# Patient Record
Sex: FEMALE | Race: WHITE | NOT HISPANIC OR LATINO | Employment: UNEMPLOYED
[De-identification: names, ages, dates, MRNs, and addresses within clinical notes are randomized per-mention and may not be internally consistent; named-entity substitution may affect disease eponyms.]

---

## 2017-06-17 ENCOUNTER — HEALTH MAINTENANCE LETTER (OUTPATIENT)
Age: 52
End: 2017-06-17

## 2018-04-01 ENCOUNTER — TRANSFERRED RECORDS (OUTPATIENT)
Dept: HEALTH INFORMATION MANAGEMENT | Facility: CLINIC | Age: 53
End: 2018-04-01

## 2018-04-01 LAB — PAP SMEAR - HIM PATIENT REPORTED: NEGATIVE

## 2019-02-11 ENCOUNTER — TRANSFERRED RECORDS (OUTPATIENT)
Dept: HEALTH INFORMATION MANAGEMENT | Facility: CLINIC | Age: 54
End: 2019-02-11

## 2019-02-25 ENCOUNTER — TRANSFERRED RECORDS (OUTPATIENT)
Dept: HEALTH INFORMATION MANAGEMENT | Facility: CLINIC | Age: 54
End: 2019-02-25

## 2019-02-25 ENCOUNTER — OFFICE VISIT (OUTPATIENT)
Dept: FAMILY MEDICINE | Facility: CLINIC | Age: 54
End: 2019-02-25
Payer: COMMERCIAL

## 2019-02-25 VITALS
SYSTOLIC BLOOD PRESSURE: 137 MMHG | HEART RATE: 83 BPM | BODY MASS INDEX: 27.6 KG/M2 | OXYGEN SATURATION: 99 % | WEIGHT: 150 LBS | TEMPERATURE: 97 F | DIASTOLIC BLOOD PRESSURE: 86 MMHG | HEIGHT: 62 IN

## 2019-02-25 DIAGNOSIS — R03.0 ELEVATED BP WITHOUT DIAGNOSIS OF HYPERTENSION: Primary | ICD-10-CM

## 2019-02-25 DIAGNOSIS — R07.89 CHEST PRESSURE: ICD-10-CM

## 2019-02-25 DIAGNOSIS — D12.6 TUBULAR ADENOMA OF COLON: ICD-10-CM

## 2019-02-25 LAB
CHOLEST SERPL-MCNC: 258 MG/DL
EJECTION FRACTION: 73
HDLC SERPL-MCNC: 110 MG/DL
LDLC SERPL CALC-MCNC: 130 MG/DL
NONHDLC SERPL-MCNC: 148 MG/DL
TRIGL SERPL-MCNC: 90 MG/DL
TSH SERPL DL<=0.005 MIU/L-ACNC: 2.44 MU/L (ref 0.4–4)

## 2019-02-25 PROCEDURE — 36415 COLL VENOUS BLD VENIPUNCTURE: CPT | Performed by: INTERNAL MEDICINE

## 2019-02-25 PROCEDURE — 84443 ASSAY THYROID STIM HORMONE: CPT | Performed by: INTERNAL MEDICINE

## 2019-02-25 PROCEDURE — 80061 LIPID PANEL: CPT | Performed by: INTERNAL MEDICINE

## 2019-02-25 PROCEDURE — 99203 OFFICE O/P NEW LOW 30 MIN: CPT | Performed by: INTERNAL MEDICINE

## 2019-02-25 ASSESSMENT — MIFFLIN-ST. JEOR: SCORE: 1238.65

## 2019-02-25 NOTE — PATIENT INSTRUCTIONS
I will send you the labs from today    Monitor your blood pressure 3 or 4x a week, sit for 5 minutes first.  If it is not staying below 140/90 let me know.    Lencho Goldberg M.D.

## 2019-02-25 NOTE — Clinical Note
[facility] by harry benz 4/18 and Kellie Goldberg M.D.

## 2019-02-25 NOTE — PROGRESS NOTES
This is a 53-year-old female who I have not seen in many years here for follow-up for her chest pain as well as high blood pressure.  She has no history of hypertension but it does run in the family.  She does not smoke and has minimal alcohol.  Some caffeine and infrequent NSAIDs.  She does use some salt.    Last Thursday at home she developed chest pressure as well as feeling short of breath.  She went into months grocery store and her blood pressure was quite high so she went to an urgent care and then was directed to the ER.  I reviewed the ER notes and labs.  Her troponins and d-dimer were normal and her BMP and CBC was normal.  She was sent out to have a stress test but due to elevated blood pressure they could not do it but are doing it today.    She has no history of chest pressure and she is very active.  She did have a lot of stress that day.  Since then she is felt fine without any headaches or dizziness.  No chest pain or shortness of breath.  She does work out and does not have chest pain or shortness of breath.  No recent dizziness or palpitations.               Past Medical History:      Past Medical History:   Diagnosis Date     Elevated blood pressure reading without diagnosis of hypertension (aka BP)      Tubular adenoma of colon 02/2016    fu 5 years             Past Surgical History:      Past Surgical History:   Procedure Laterality Date     COLONOSCOPY N/A 2/22/2016    Procedure: COMBINED COLONOSCOPY, SINGLE OR MULTIPLE BIOPSY/POLYPECTOMY BY BIOPSY;  Surgeon: Carie Barnes MD;  Location:  GI     KNEE [facility]GERY [employer]             Social History:     Social History     Socioeconomic History     Marital status:      Spouse name: Not on file     Number of children: 3     Years of education: Not on file     Highest education level: Not on file   Occupational History     Occupation: volunteer work   Social Needs     Financial resource strain: Not on file     Food insecurity:  "    Worry: Not on file     Inability: Not on file     Transportation needs:     Medical: Not on file     Non-medical: Not on file   Tobacco Use     Smoking status: Never Smoker     Smokeless tobacco: Never Used   Substance and Sexual Activity     Alcohol use: Yes     Alcohol/week: 2.5 oz     Types: 5 Standard drinks or equivalent per week     Comment: 5 drinks a week      Drug use: No     Sexual activity: Yes     Partners: Male   Lifestyle     Physical activity:     Days per week: Not on file     Minutes per session: Not on file     Stress: Not on file   Relationships     Social connections:     Talks on phone: Not on file     Gets together: Not on file     Attends Hindu service: Not on file     Active member of club or organization: Not on file     Attends meetings of clubs or organizations: Not on file     Relationship status: Not on file     Intimate partner violence:     Fear of current or ex partner: Not on file     Emotionally abused: Not on file     Physically abused: Not on file     Forced sexual activity: Not on file   Other Topics Concern     Parent/sibling w/ CABG, MI or angioplasty before 65F 55M? Not Asked   Social History Narrative     Not on file             Family History:   reviewed         Allergies:   No Known Allergies          Medications:     No current outpatient medications on file.               Review of Systems:   The 10 point Review of Systems is negative other than noted in the HPI           Physical Exam:   Blood pressure 137/86, pulse 83, temperature 97  F (36.1  C), temperature source Oral, height 1.575 m (5' 2\"), weight 68 kg (150 lb), SpO2 99 %.  Blood pressure same bilat for me  Exam:  Constitutional: healthy appearing, alert and in no distress  Heent: Normocephalic. Head without obvious masses or lesions. EOMI. Mouth exam within normal limits: tongue, mucous membranes, posterior pharynx all normal, no lesions or abnormalities seen. Neck supple, no nuchal rigidity or masses. No " supraclavicular, or cervical adenopathy. Thyroid symmetric, no masses.  Cardiovascular: Regular rate and rhythm, 1/6 sm lusb, no rub or gallops.  JVP not elevated, no edema.  Carotids within normal limits bilaterally, no bruits.  Respiratory: Normal respiratory effort.  Lungs clear, normal flow, no wheezing or crackles.  Gastrointestinal: Normal active bowel sounds.   Soft, not tender, no masses, guarding or rebound.  No hepatosplenomegaly.   Musculoskeletal: extremities normal, no gross deformities noted.  Skin: no suspicious lesions or rashes   Neurologic: Mental status within normal limits.  Speech fluent.  No gross motor abnormalities and gait intact.  Psychiatric: mentation appears normal and affect normal.         Data:   Labs noted, others sent today        Assessment:   1. Chest pressure and shortness of breath, suspect stress but has est today, doubt aneurysm, pulmonary embolis, ptx, ascvd, etc  2. Hypertension, ?real, ?due to stress although fairly high.  No signs secondary cause, will check other labs         Plan:   Est today  Other labs today  She will get cuff, monitor blood pressure and if not under 140/90 call  Exercise, diet and weight loss rec      Lencho Goldberg M.D.

## 2019-02-26 ENCOUNTER — TELEPHONE (OUTPATIENT)
Dept: FAMILY MEDICINE | Facility: CLINIC | Age: 54
End: 2019-02-26

## 2019-02-26 NOTE — RESULT ENCOUNTER NOTE
It was a please seeing you.  You should be able to view your labs.    Your TSH or thyroid test is normal indicating no active thyroid issues.  Your total cholesterol is 258 which sounds high.  However, you have a tremendous amount of HDL or good cholesterol at 110.  Your LDL or bad cholesterol is only slightly elevated at 130.  Given the super amount of HDL I am not at all concerned but would strongly encourage regular exercise and healthy diet.    I am happy to bring you this excellent report.  If you have any questions let me know.    Lencho Goldberg M.D.

## 2019-02-26 NOTE — TELEPHONE ENCOUNTER
Got it and it looks normal, please monitor blood pressure and if up call    Lencho Goldberg M.D.

## 2019-02-26 NOTE — TELEPHONE ENCOUNTER
Reason for Call:   Stress Test    Detailed comments:   Pt had appt with Dr. Goldberg yesterday 01/25/2019.  She had a stress test performed and completed at [facility].  She called to confirmed it had been faxed.  It has been received and placed on Dr. Goldberg's desk.      Phone Number Patient can be reached at: Home number on file [phone] (home)    Best Time: Any     Can we leave a detailed message on this number? YES    Call taken on 2/26/2019 at 1:50 PM by Daya Woods

## 2019-08-05 ENCOUNTER — TRANSFERRED RECORDS (OUTPATIENT)
Dept: HEALTH INFORMATION MANAGEMENT | Facility: CLINIC | Age: 54
End: 2019-08-05

## 2019-08-26 ENCOUNTER — TRANSFERRED RECORDS (OUTPATIENT)
Dept: HEALTH INFORMATION MANAGEMENT | Facility: CLINIC | Age: 54
End: 2019-08-26

## 2019-09-23 ENCOUNTER — TRANSFERRED RECORDS (OUTPATIENT)
Dept: HEALTH INFORMATION MANAGEMENT | Facility: CLINIC | Age: 54
End: 2019-09-23

## 2019-10-01 ENCOUNTER — HEALTH MAINTENANCE LETTER (OUTPATIENT)
Age: 54
End: 2019-10-01

## 2021-01-15 ENCOUNTER — HEALTH MAINTENANCE LETTER (OUTPATIENT)
Age: 56
End: 2021-01-15

## 2021-03-26 ENCOUNTER — TELEPHONE (OUTPATIENT)
Dept: FAMILY MEDICINE | Facility: CLINIC | Age: 56
End: 2021-03-26

## 2021-03-26 NOTE — TELEPHONE ENCOUNTER
Reason for Call: Request for an order or referral:    Order or referral being requested: order    Date needed: as soon as possible    Has the patient been seen by the PCP for this problem? YES    Additional comments: Patient is in [place] and got a Moderma shot on 03/24/21 with lot#[id number]. Patient will be back in [place] and is wondering if he can get an order to get the 2nd shot. Thank you    Phone number Patient can be reached at:  Cell number on file:    Telephone Information:   Mobile [phone]       Best Time:  anytime    Can we leave a detailed message on this number?  YES    Call taken on 3/26/2021 at 11:19 AM by Audelia Peralta

## 2021-04-24 ENCOUNTER — IMMUNIZATION (OUTPATIENT)
Dept: NURSING | Facility: CLINIC | Age: 56
End: 2021-04-24
Attending: INTERNAL MEDICINE
Payer: COMMERCIAL

## 2021-04-24 DIAGNOSIS — Z23 NEED FOR VACCINATION: ICD-10-CM

## 2021-04-24 DIAGNOSIS — Z23 HIGH PRIORITY FOR 2019 NOVEL CORONAVIRUS VACCINATION: ICD-10-CM

## 2021-04-24 PROCEDURE — 91301 PR COVID VAC MODERNA 100 MCG/0.5 ML IM: CPT

## 2021-04-24 PROCEDURE — 0011A PR COVID VAC MODERNA 100 MCG/0.5 ML IM: CPT

## 2021-05-15 ENCOUNTER — HEALTH MAINTENANCE LETTER (OUTPATIENT)
Age: 56
End: 2021-05-15

## 2021-09-04 ENCOUNTER — HEALTH MAINTENANCE LETTER (OUTPATIENT)
Age: 56
End: 2021-09-04

## 2021-09-20 ENCOUNTER — TRANSFERRED RECORDS (OUTPATIENT)
Dept: MULTI SPECIALTY CLINIC | Facility: CLINIC | Age: 56
End: 2021-09-20

## 2021-09-20 LAB — PAP SMEAR - HIM PATIENT REPORTED: NEGATIVE

## 2021-09-23 DIAGNOSIS — Z11.59 ENCOUNTER FOR SCREENING FOR OTHER VIRAL DISEASES: ICD-10-CM

## 2021-10-01 ENCOUNTER — APPOINTMENT (OUTPATIENT)
Dept: URBAN - METROPOLITAN AREA CLINIC 255 | Age: 56
Setting detail: DERMATOLOGY
End: 2021-10-10

## 2021-10-01 DIAGNOSIS — L57.0 ACTINIC KERATOSIS: ICD-10-CM

## 2021-10-01 DIAGNOSIS — L82.1 OTHER SEBORRHEIC KERATOSIS: ICD-10-CM

## 2021-10-01 PROCEDURE — 17003 DESTRUCT PREMALG LES 2-14: CPT

## 2021-10-01 PROCEDURE — OTHER COUNSELING: OTHER

## 2021-10-01 PROCEDURE — OTHER LIQUID NITROGEN (COSMETIC): OTHER

## 2021-10-01 PROCEDURE — 17000 DESTRUCT PREMALG LESION: CPT

## 2021-10-01 PROCEDURE — OTHER MIPS QUALITY: OTHER

## 2021-10-01 PROCEDURE — OTHER LIQUID NITROGEN: OTHER

## 2021-10-01 ASSESSMENT — LOCATION SIMPLE DESCRIPTION DERM
LOCATION SIMPLE: LEFT EAR
LOCATION SIMPLE: LEFT FOREHEAD

## 2021-10-01 ASSESSMENT — LOCATION ZONE DERM
LOCATION ZONE: EAR
LOCATION ZONE: FACE

## 2021-10-01 ASSESSMENT — LOCATION DETAILED DESCRIPTION DERM
LOCATION DETAILED: LEFT FOREHEAD
LOCATION DETAILED: LEFT CYMBA CONCHA

## 2021-10-01 NOTE — PROCEDURE: MIPS QUALITY
No manual forms completed during downtime.
Quality 110: Preventive Care And Screening: Influenza Immunization: Influenza Immunization not Administered because Patient Refused.
Quality 226: Preventive Care And Screening: Tobacco Use: Screening And Cessation Intervention: Patient screened for tobacco use and is an ex/non-smoker
Quality 431: Preventive Care And Screening: Unhealthy Alcohol Use - Screening: Patient not identified as an unhealthy alcohol user when screened for unhealthy alcohol use using a systematic screening method
Quality 130: Documentation Of Current Medications In The Medical Record: Current Medications Documented
Detail Level: Detailed

## 2021-10-27 ENCOUNTER — HOSPITAL ENCOUNTER (OUTPATIENT)
Facility: CLINIC | Age: 56
Discharge: HOME | End: 2021-10-27
Attending: COLON & RECTAL SURGERY | Admitting: COLON & RECTAL SURGERY
Payer: COMMERCIAL

## 2021-10-27 VITALS
DIASTOLIC BLOOD PRESSURE: 75 MMHG | SYSTOLIC BLOOD PRESSURE: 126 MMHG | WEIGHT: 145 LBS | RESPIRATION RATE: 14 BRPM | BODY MASS INDEX: 25.69 KG/M2 | HEIGHT: 63 IN | OXYGEN SATURATION: 99 % | HEART RATE: 73 BPM

## 2021-10-27 LAB — COLONOSCOPY: NORMAL

## 2021-10-27 PROCEDURE — 45385 COLONOSCOPY W/LESION REMOVAL: CPT | Performed by: COLON & RECTAL SURGERY

## 2021-10-27 PROCEDURE — 88305 TISSUE EXAM BY PATHOLOGIST: CPT | Mod: TC | Performed by: COLON & RECTAL SURGERY

## 2021-10-27 PROCEDURE — 250N000011 HC RX IP 250 OP 636: Performed by: COLON & RECTAL SURGERY

## 2021-10-27 PROCEDURE — 99153 MOD SED SAME PHYS/QHP EA: CPT | Performed by: COLON & RECTAL SURGERY

## 2021-10-27 PROCEDURE — G0500 MOD SEDAT ENDO SERVICE >5YRS: HCPCS | Performed by: COLON & RECTAL SURGERY

## 2021-10-27 RX ORDER — ONDANSETRON 2 MG/ML
4 INJECTION INTRAMUSCULAR; INTRAVENOUS
Status: DISCONTINUED | OUTPATIENT
Start: 2021-10-27 | End: 2021-10-27 | Stop reason: HOSPADM

## 2021-10-27 RX ORDER — FENTANYL CITRATE 50 UG/ML
INJECTION, SOLUTION INTRAMUSCULAR; INTRAVENOUS PRN
Status: COMPLETED | OUTPATIENT
Start: 2021-10-27 | End: 2021-10-27

## 2021-10-27 RX ORDER — LIDOCAINE 40 MG/G
CREAM TOPICAL
Status: DISCONTINUED | OUTPATIENT
Start: 2021-10-27 | End: 2021-10-27 | Stop reason: HOSPADM

## 2021-10-27 RX ADMIN — MIDAZOLAM 2 MG: 1 INJECTION INTRAMUSCULAR; INTRAVENOUS at 08:18

## 2021-10-27 RX ADMIN — FENTANYL CITRATE 100 MCG: 50 INJECTION, SOLUTION INTRAMUSCULAR; INTRAVENOUS at 08:17

## 2021-10-27 ASSESSMENT — MIFFLIN-ST. JEOR: SCORE: 1208.91

## 2021-10-27 NOTE — H&P
Colon & Rectal Surgery History and Physical  Pre-Endoscopy Procedure Note    History of Present Illness   I have been asked by Dr. Goldberg to evaluate this 56 year old female for colorectal polyp surveillance. She had an adenomatous polyp removed during colonoscopy in 2016 and currently denies any abdominal pain, weight loss, bleeding per rectum, or recent change in bowel habits.    Past Medical History  Diagnosis Date     Chest pain 02/2019    nuclear est nl done at [facility]     Elevated blood pressure reading without diagnosis of hypertension      Tubular adenoma of colon 02/2016    f/u 5 years       Past Surgical History  Procedure Laterality Date     COLONOSCOPY N/A 2/22/2016    Procedure: COMBINED COLONOSCOPY, SINGLE OR MULTIPLE BIOPSY/POLYPECTOMY BY BIOPSY;  Surgeon: Carie Barnes MD;  Location:  GI     KNEE [facility]GERY [employer]        Medications  No medications prior to admission.       Allergies   No Known Allergies     Family History   Family history includes Other Cancer in her father; Prostate Cancer in her father.     Social History    She reports that she has never smoked. She has never used smokeless tobacco. She reports current alcohol use of about 4.2 standard drinks of alcohol per week. She reports that she does not use drugs.    Review of Systems   Constitutional:  No fever, weight change or fatigue.    Eyes:     No dry eyes or vision changes.   Ears/Nose/Throat/Neck:  No oral ulcers, sore throat or voice change.    Cardiovascular:   No palpitations, syncope, angina or edema.   Respiratory:    No chest pain, excessive sleepiness, shortness of breath or hemoptysis.    Gastrointestinal:   No abdominal pain, nausea, vomiting, diarrhea or heartburn.    Genitourinary:   No dysuria, hematuria, urinary retention or urinary frequency.   Musculoskeletal:  No joint swelling or arthralgias.    Dermatologic:  No skin rash or other skin changes.   Neurologic:    No focal weakness or numbness. No  "neuropathy.   Psychiatric:    No depression, anxiety, suicidal ideation, or paranoid ideation.   Endocrine:   No cold or heat intolerance, polydipsia, hirsutism, change in libido, or flushing.   Hematology/Lymphatic:  No bleeding or lymphadenopathy.    Allergy/Immunology:  No rhinitis or hives.     Physical Exam   Vitals:  /83, HR 76, RR 16, height 1.588 m (5' 2.5\"), weight 65.8 kg (145 lb), SpO2 100 %.    General:  Alert and oriented to person, place and time   Airway: Normal oropharyngeal airway and neck mobility   Lungs:  Clear bilaterally   Heart:  Regular rate and rhythm   Abdomen: Soft, NT, ND, no masses   Extremities: Warm, good capillary refill    ASA Grade: I (normal healthy patient)      Impression: Cleared for use of conscious sedation for colorectal polyp surveillance    Plan: Proceed with colonoscopy     Carie Barnes MD  [facility]  [phone]  "

## 2021-10-28 LAB
PATH REPORT.COMMENTS IMP SPEC: NORMAL
PATH REPORT.COMMENTS IMP SPEC: NORMAL
PATH REPORT.FINAL DX SPEC: NORMAL
PATH REPORT.GROSS SPEC: NORMAL
PATH REPORT.MICROSCOPIC SPEC OTHER STN: NORMAL
PHOTO IMAGE: NORMAL

## 2021-10-28 PROCEDURE — 88305 TISSUE EXAM BY PATHOLOGIST: CPT | Mod: 26 | Performed by: PATHOLOGY

## 2022-02-19 ENCOUNTER — HEALTH MAINTENANCE LETTER (OUTPATIENT)
Age: 57
End: 2022-02-19

## 2022-05-09 ENCOUNTER — APPOINTMENT (OUTPATIENT)
Dept: URBAN - METROPOLITAN AREA CLINIC 255 | Age: 57
Setting detail: DERMATOLOGY
End: 2022-05-10

## 2022-05-09 DIAGNOSIS — Z85.828 PERSONAL HISTORY OF OTHER MALIGNANT NEOPLASM OF SKIN: ICD-10-CM

## 2022-05-09 DIAGNOSIS — L57.0 ACTINIC KERATOSIS: ICD-10-CM

## 2022-05-09 PROCEDURE — OTHER COUNSELING: OTHER

## 2022-05-09 PROCEDURE — 17003 DESTRUCT PREMALG LES 2-14: CPT

## 2022-05-09 PROCEDURE — OTHER LIQUID NITROGEN: OTHER

## 2022-05-09 PROCEDURE — OTHER MIPS QUALITY: OTHER

## 2022-05-09 PROCEDURE — 17000 DESTRUCT PREMALG LESION: CPT

## 2022-05-09 PROCEDURE — 99212 OFFICE O/P EST SF 10 MIN: CPT | Mod: 25

## 2022-05-09 ASSESSMENT — LOCATION ZONE DERM: LOCATION ZONE: FACE

## 2022-05-09 ASSESSMENT — LOCATION DETAILED DESCRIPTION DERM
LOCATION DETAILED: RIGHT SUPERIOR CENTRAL MALAR CHEEK
LOCATION DETAILED: RIGHT LATERAL ZYGOMA
LOCATION DETAILED: RIGHT SUBMANDIBULAR AREA

## 2022-05-09 ASSESSMENT — LOCATION SIMPLE DESCRIPTION DERM
LOCATION SIMPLE: RIGHT CHEEK
LOCATION SIMPLE: RIGHT ZYGOMA
LOCATION SIMPLE: RIGHT SUBMANDIBULAR AREA

## 2022-05-09 NOTE — PROCEDURE: LIQUID NITROGEN
Consent: The patient's consent was obtained including but not limited to risks of crusting, scabbing, blistering, scarring, darker or lighter pigmentary change, recurrence, incomplete removal and infection.
Number Of Freeze-Thaw Cycles: 1 freeze-thaw cycle
Duration Of Freeze Thaw-Cycle (Seconds): 10
Show Applicator Variable?: Yes
Post-Care Instructions: I reviewed with the patient in detail post-care instructions. Patient is to wear sunprotection, and avoid picking at any of the treated lesions. Pt may apply Vaseline to crusted or scabbing areas.
Render Post-Care Instructions In Note?: no
Detail Level: Detailed

## 2022-06-23 ENCOUNTER — TELEPHONE (OUTPATIENT)
Dept: FAMILY MEDICINE | Facility: CLINIC | Age: 57
End: 2022-06-23

## 2022-06-23 ENCOUNTER — NURSE TRIAGE (OUTPATIENT)
Dept: FAMILY MEDICINE | Facility: CLINIC | Age: 57
End: 2022-06-23

## 2022-06-23 NOTE — TELEPHONE ENCOUNTER
Tested positive for Covid-19 6/23/22 via Rapid nasal home test    Patient is interested in starting oral treatment, advised patient to call [phone], or [phone] to schedule a Virtual Visit with a provider to discuss treatment options. Patient verbalized agreement and will call now.           Routing to Ambulatory Infection Prevention to update chart status

## 2022-06-23 NOTE — TELEPHONE ENCOUNTER
Reason for Call:  Other appointment    Detailed comments: patient's  called today to schedule a Covid-Treatment virtual appointment, the call was transferred to the triage line due to no availabilities today and patient's  is aware that there are no openings, but is still looking for options.    Phone Number Patient can be reached at: Cell number on file:    Telephone Information:   Mobile [phone]       Best Time: ANY    Can we leave a detailed message on this number? YES    Call taken on 6/23/2022 at 11:39 AM by Alyssia Cantu

## 2022-06-23 NOTE — TELEPHONE ENCOUNTER
Spoke to patient and her symptoms are the cough since testing positive for Covid. She will try OTC cough syrup Robitussin DM and Tylenol .     Covid virtual appointment tomorrow with provider.     Reason for Disposition    COVID-19 vaccine, questions about    [1] COVID-19 diagnosed by positive lab test (e.g., PCR, rapid self-test kit) AND [2] mild symptoms (e.g., cough, fever, others) AND [3] no complications or SOB    COVID-19 Home Isolation, questions about    COVID-19 Testing, questions about    COVID-19 Prevention and Healthy Living, questions about    COVID-19 Disease, questions about    Additional Information    Negative: SEVERE difficulty breathing (e.g., struggling for each breath, speaks in single words)    Negative: Difficult to awaken or acting confused (e.g., disoriented, slurred speech)    Negative: Bluish (or gray) lips or face now    Negative: Shock suspected (e.g., cold/pale/clammy skin, too weak to stand, low BP, rapid pulse)    Negative: Sounds like a life-threatening emergency to the triager    Negative: [1] Difficulty breathing or swallowing AND [2] starts within 2 hours after injection    Negative: Sounds like a life-threatening emergency to the triager    Negative: [1] Symptoms of COVID-19 (e.g., cough, fever, SOB, or others) AND [2] within 14 days of EXPOSURE (close contact) with diagnosed or suspected COVID-19 patient    Negative: [1] Symptoms of COVID-19 (e.g., cough, fever, SOB, or others) AND [2] within 14 days of being at a crowded indoor or outdoor event (e.g., concert, festival, rally, wedding)    Negative: Typical COVID-19 symptoms (e.g., cough, difficulty breathing, loss of taste or smell, runny nose, sore throat) that are NOT expected from vaccine    Negative: [1] COVID-19 exposure AND [2] no symptoms, or symptoms not typical of COVID-19    Negative: Fever > 104 F (40 C)    Negative: Sounds like a severe, unusual reaction to the triager    Negative: [1] Redness or red streak around  the injection site AND [2] started > 48 hours after getting vaccine AND [3] fever    Negative: [1] Fever > 101 F (38.3 C) AND [2] age > 60 years AND [3] started > 48 hours after getting vaccine    Negative: [1] Fever > 100.0 F (37.8 C) AND [2] bedridden (e.g., nursing home patient, CVA, chronic illness, recovering from surgery) AND [3] started > 48 hours after getting vaccine    Negative: [1] Fever > 100.0 F (37.8 C) AND [2] diabetes mellitus or weak immune system (e.g., HIV positive, cancer chemo, splenectomy, organ transplant, chronic steroids) AND [3] started > 48 hours after getting vaccine    Negative: [1] Fever > 100.0 F (37.8 C) AND [2] present > 3 days (72 hours)    Negative: [1] Pain, tenderness, or swelling at the injection site AND [2] lasts > 7 days    Negative: [1] Lymph node swelling (i.e., armpit or neck on side of vaccine) AND [2] lasts > 3 weeks    Negative: [1] Requesting COVID-19 vaccine AND [2] healthcare worker (e.g., EMS first responders, doctors, nurses)    Negative: [1] Requesting COVID-19 vaccine AND [2] resident of a long-term care facility (e.g., nursing home)    Negative: Requesting COVID-19 vaccine    Negative: COVID-19 vaccine, injection site reaction (e.g., pain, redness, swelling), question about    Negative: COVID-19 vaccine, systemic reactions (e.g., fatigue, fever, muscle aches), questions about    Negative: COVID-19 vaccine, Frequently Asked Questions (FAQs)    Negative: Up-to-date on COVID-19 vaccination and exposure to COVID-19, Frequently Asked Questions (FAQs)    Negative: COVID-19 Prevention and Healthy Living, questions about    Negative: SEVERE difficulty breathing (e.g., struggling for each breath, speaks in single words)    Negative: Difficult to awaken or acting confused (e.g., disoriented, slurred speech)    Negative: Bluish (or gray) lips or face now    Negative: Shock suspected (e.g., cold/pale/clammy skin, too weak to stand, low BP, rapid pulse)    Negative: Sounds  like a life-threatening emergency to the triager    Negative: [1] Diagnosed or suspected COVID-19 AND [2] symptoms lasting 3 or more weeks    Negative: [1] COVID-19 exposure AND [2] no symptoms    Negative: COVID-19 vaccine reaction suspected (e.g., fever, headache, muscle aches) occurring 1 to 3 days after getting vaccine    Negative: COVID-19 vaccine, questions about    Negative: [1] Lives with someone known to have influenza (flu test positive) AND [2] flu-like symptoms (e.g., cough, runny nose, sore throat, SOB; with or without fever)    Negative: [1] Adult with possible COVID-19 symptoms AND [2] triager concerned about severity of symptoms or other causes    Negative: COVID-19 and breastfeeding, questions about    Negative: SEVERE or constant chest pain or pressure  (Exception: Mild central chest pain, present only when coughing.)    Negative: MODERATE difficulty breathing (e.g., speaks in phrases, SOB even at rest, pulse 100-120)    Negative: Headache and stiff neck (can't touch chin to chest)    Negative: Oxygen level (e.g., pulse oximetry) 90 percent or lower    Negative: Chest pain or pressure    Negative: Patient sounds very sick or weak to the triager    Negative: MILD difficulty breathing (e.g., minimal/no SOB at rest, SOB with walking, pulse <100)    Negative: Fever > 103 F (39.4 C)    Negative: [1] Fever > 101 F (38.3 C) AND [2] over 60 years of age    Negative: [1] Fever > 100.0 F (37.8 C) AND [2] bedridden (e.g., nursing home patient, CVA, chronic illness, recovering from surgery)    Negative: HIGH RISK for severe COVID complications (e.g., weak immune system, age > 64 years, obesity with BMI > 25, pregnant, chronic lung disease or other chronic medical condition) (Exception: Already seen by PCP and no new or worsening symptoms.)    Negative: [1] HIGH RISK patient AND [2] influenza is widespread in the community AND [3] ONE OR MORE respiratory symptoms: cough, sore throat, runny or stuffy nose     "Negative: [1] HIGH RISK patient AND [2] influenza exposure within the last 7 days AND [3] ONE OR MORE respiratory symptoms: cough, sore throat, runny or stuffy nose    Negative: Oxygen level (e.g., pulse oximetry) 91 to 94 percent    Negative: [1] COVID-19 infection suspected by caller or triager AND [2] mild symptoms (cough, fever, or others) AND [3] negative COVID-19 rapid test    Negative: Fever present > 3 days (72 hours)    Negative: [1] Fever returns after gone for over 24 hours AND [2] symptoms worse or not improved    Negative: [1] Continuous (nonstop) coughing interferes with work or school AND [2] no improvement using cough treatment per Care Advice    Negative: Cough present > 3 weeks    Negative: [1] COVID-19 diagnosed by positive lab test (e.g., PCR, rapid self-test kit) AND [2] NO symptoms (e.g., cough, fever, others)    Negative: [1] COVID-19 diagnosed by doctor (or NP/PA) AND [2] mild symptoms (e.g., cough, fever, others) AND [3] no complications or SOB    Negative: [1] COVID-19 diagnosed AND [2] has mild nausea, vomiting or diarrhea    Negative: [1] COVID-19 infection suspected by caller or triager AND [2] mild symptoms (cough, fever, or others) AND [3] has not gotten tested yet    Answer Assessment - Initial Assessment Questions  1. COVID-19 DIAGNOSIS: \"Who made your COVID-19 diagnosis?\" \"Was it confirmed by a positive lab test or self-test?\" If not diagnosed by a doctor (or NP/PA), ask \"Are there lots of cases (community spread) where you live?\" Note: See public health department website, if unsure.      Home test.   2. COVID-19 EXPOSURE: \"Was there any known exposure to COVID before the symptoms began?\" CDC Definition of close contact: within 6 feet (2 meters) for a total of 15 minutes or more over a 24-hour period.       Yes , .   3. ONSET: \"When did the COVID-19 symptoms start?\"       Yesterday.   4. WORST SYMPTOM: \"What is your worst symptom?\" (e.g., cough, fever, shortness of breath, " "muscle aches)      Cough and malaise.   5. COUGH: \"Do you have a cough?\" If Yes, ask: \"How bad is the cough?\"        Coughing often.   6. FEVER: \"Do you have a fever?\" If Yes, ask: \"What is your temperature, how was it measured, and when did it start?\"      No  7. RESPIRATORY STATUS: \"Describe your breathing?\" (e.g., shortness of breath, wheezing, unable to speak)       Breathing okay.   8. BETTER-SAME-WORSE: \"Are you getting better, staying the same or getting worse compared to yesterday?\"  If getting worse, ask, \"In what way?\"      Feel the same as yesterday.   9. HIGH RISK DISEASE: \"Do you have any chronic medical problems?\" (e.g., asthma, heart or lung disease, weak immune system, obesity, etc.)      No  10. VACCINE: \"Have you had the COVID-19 vaccine?\" If Yes, ask: \"Which one, how many shots, when did you get it?\"        Yes  11. BOOSTER: \"Have you received your COVID-19 booster?\" If Yes, ask: \"Which one and when did you get it?\"        No  12. PREGNANCY: \"Is there any chance you are pregnant?\" \"When was your last menstrual period?\"        No  13. OTHER SYMPTOMS: \"Do you have any other symptoms?\"  (e.g., chills, fatigue, headache, loss of smell or taste, muscle pain, sore throat)        No  14. O2 SATURATION MONITOR:  \"Do you use an oxygen saturation monitor (pulse oximeter) at home?\" If Yes, ask \"What is your reading (oxygen level) today?\" \"What is your usual oxygen saturation reading?\" (e.g., 95%)        No O2 meter.    Protocols used: CORONAVIRUS (COVID-19) DIAGNOSED OR [id number] 1.18.2022, CORONAVIRUS (COVID-19) DIAGNOSED OR [id number] 1.18.2022, CORONAVIRUS (COVID-19) VACCINE QUESTIONS AND [id number] 1.18.2022    Anayeli Glynn RN  [facility]     "

## 2022-06-24 ENCOUNTER — VIRTUAL VISIT (OUTPATIENT)
Dept: FAMILY MEDICINE | Facility: CLINIC | Age: 57
End: 2022-06-24
Payer: COMMERCIAL

## 2022-06-24 DIAGNOSIS — U07.1 INFECTION DUE TO 2019 NOVEL CORONAVIRUS: Primary | ICD-10-CM

## 2022-06-24 PROCEDURE — 99203 OFFICE O/P NEW LOW 30 MIN: CPT | Mod: GT | Performed by: INTERNAL MEDICINE

## 2022-06-24 RX ORDER — ESTRADIOL 1 MG/1
1 TABLET ORAL DAILY
COMMUNITY
Start: 2022-05-25

## 2022-06-24 RX ORDER — PROGESTERONE 200 MG/1
CAPSULE ORAL
COMMUNITY
Start: 2022-05-03

## 2022-06-24 NOTE — PROGRESS NOTES
"Nasrin is a 57 year old who is being evaluated via a billable video visit.      How would you like to obtain your AVS? MyChart  If the video visit is dropped, the invitation should be resent by: Text to cell phone: [phone]  Will anyone else be joining your video visit? No          Assessment & Plan     Infection due to 2019 novel coronavirus  Symptom onset on June 22, subsequent home antigen test positive on June 23.   testing positive for COVID-19 via home antigen test on June 20 while residing at [facility]  Does have plans for international cruise travel on July 5 with her mother to [place]  She has been vaccine against COVID-19 including booster dosing this past November  Discussed low probability for severe disease complications.  Does have some increased risk factors, primarily related to age over 55.  She is within the 5-day window of symptom frequency, and I do think it is reasonable in her setting to consider antiviral therapy.  I would like to forego paxlovid therapy given relatively lower risk profile.  I think molnupiravir is a very reasonable alternative therapy.  She was interested in this as an option.  Letter drafted for her regarding her future travel documenting her visit and COVID positivity as of June 23.  I strongly recommended her to follow-up through her primary care clinic within the next week to 10 days after resolution of symptoms for confirmatory letter showing recovery from COVID-19    - molnupiravir (LAGEVRIO) 200 MG capsule; Take 4 capsules (800 mg) by mouth every 12 hours     BMI:   Estimated body mass index is 26.1 kg/m  as calculated from the following:    Height as of 10/27/21: 1.588 m (5' 2.5\").    Weight as of 10/27/21: 65.8 kg (145 lb).       No follow-ups on file.    Missael Mobley MD  [facility]    Subjective   Nasrin is a 57 year old{ presenting for the following health issues:  Covid Concern (Tested + 6/23 - symptoms started " 6/22 c/o chest congestion, cough, sore throat, nasal congestion, fatigue.  Denies SOB, fever.   tested + 6/27)      HPI     Review of Systems   Constitutional, HEENT, cardiovascular, pulmonary, gi and gu systems are negative, except as otherwise noted.      Objective           Vitals:  No vitals were obtained today due to virtual visit.    Physical Exam   GENERAL: Healthy, alert and no distress  EYES: Eyes grossly normal to inspection.  No discharge or erythema, or obvious scleral/conjunctival abnormalities.  RESP: No audible wheeze, cough, or visible cyanosis.  No visible retractions or increased work of breathing.    SKIN: Visible skin clear. No significant rash, abnormal pigmentation or lesions.  NEURO: Cranial nerves grossly intact.  Mentation and speech appropriate for age.  PSYCH: Mentation appears normal, affect normal/bright, judgement and insight intact, normal speech and appearance well-groomed.            Video-Visit Details    Video Start Time: 1005    Type of service:  Video Visit    Video End Time:1015    Originating Location (pt. Location): Home    Distant Location (provider location):  [facility]     Platform used for Video Visit: [employer]    .  ..

## 2022-06-24 NOTE — LETTER
June 24, 2022      Nasrin Zamora  [address] [facility] [address]        To Whom It May Concern:    Nasrin Zamora was seen in our clinic virtually on 6/24/2022. She recently contracted COVID symptoms beginning on 6/22/2022 and subsequently tested positive for COVID19 on a home antigen test. I am recommending 10 day isolation beginning from date of symptom onset (6/22/2022)/  I am recommending that she not pursue any subsequent COVID19 PCR testing for 90 days given known recent contraction of infection, and high probability of continued PCR positivity for up to 90 days.    Assuming, resolution of current upper respiratory tract symptoms, I see no medical reasons why she cannot participate in future international cruise travels from a COVID perspective.      Sincerely,        Missael Mobley MD

## 2022-06-27 ENCOUNTER — VIRTUAL VISIT (OUTPATIENT)
Dept: FAMILY MEDICINE | Facility: CLINIC | Age: 57
End: 2022-06-27
Payer: COMMERCIAL

## 2022-06-27 DIAGNOSIS — Z12.31 VISIT FOR SCREENING MAMMOGRAM: ICD-10-CM

## 2022-06-27 DIAGNOSIS — Z11.59 NEED FOR HEPATITIS C SCREENING TEST: ICD-10-CM

## 2022-06-27 DIAGNOSIS — Z11.4 SCREENING FOR HIV (HUMAN IMMUNODEFICIENCY VIRUS): ICD-10-CM

## 2022-06-27 PROCEDURE — 99213 OFFICE O/P EST LOW 20 MIN: CPT | Mod: GT | Performed by: INTERNAL MEDICINE

## 2022-06-27 NOTE — LETTER
[address], [address]  [facility] [address]  Phone: [phone]    06/27/22    Nasrin Zamora  [address] [facility] [address]      To whom it may concern:     Mrs. Zamora had covid diagnosed on June 23rd and subsequently received anti-viral treatment and her symptoms have completely resolved.  She should be fine to travel on her planned trip July 5, 2022.    Sincerely,        Lencho Goldberg MD

## 2022-06-27 NOTE — PROGRESS NOTES
Nasrin is a 57 year old who is being evaluated via a billable video visit.      How would you like to obtain your AVS? Mail a copy  If the video visit is dropped, the invitation should be resent by: Text to cell phone: [phone]  Will anyone else be joining your video visit? No              Subjective   Nasrin is a 57 year old, presenting for the following health issues:  No chief complaint on file.    Patient dx with covid.  Symptoms as of Tuesday and then positive test done at home June 23,  with covid tested positive Monday.  Did virtual visit and given Molnupiravir.    As of yesterday almost 100% back to normal and also today feels very good.  Had sinus and chest congestion and those are both completely gone, had ha's and those are gone for last 4 days.  No chest pain or shortness of breath, no fever, no gi c/o.    Vitals:  No vitals were obtained today due to virtual visit.    Physical Exam   GENERAL: Healthy, alert and no distress  EYES: Eyes grossly normal to inspection.  No discharge or erythema, or obvious scleral/conjunctival abnormalities.  RESP: No audible wheeze, cough, or visible cyanosis.  No visible retractions or increased work of breathing.    SKIN: Visible skin clear. No significant rash, abnormal pigmentation or lesions.  NEURO: Cranial nerves grossly intact.  Mentation and speech appropriate for age.  PSYCH: Mentation appears normal, affect normal/bright, judgement and insight intact, normal speech and appearance well-groomed.      ASSESSMENT:  covid 19, given treatment, no asymptomatic.  I discussed with patient to call if more symptoms.  Ok to travel July 5th as planned unless more symptoms.  Letter done.    Lencho Goldberg M.D.            Video-Visit Details    Video Start Time: 9:53    Type of service:  Video Visit    Video End Time:10:08 AM    Originating Location (pt. Location): Home    Distant Location (provider location):  [facility]     Platform used for Video  Visit: Kyle    .  ..

## 2022-06-28 ENCOUNTER — TELEPHONE (OUTPATIENT)
Dept: FAMILY MEDICINE | Facility: CLINIC | Age: 57
End: 2022-06-28

## 2022-06-28 NOTE — TELEPHONE ENCOUNTER
Please abstract the following data from this visit with this patient into the appropriate field in Epic:    Tests that can be patient reported without a hard copy:    Mammogram done on this date: 09/20/2021 (approximately), by this group: [facility], results were normal.  and Pap smear done on this date: 09/20/2021 (approximately), by this group: [facility] , results were  normal.     Other Tests found in the patient's chart through Chart Review/Care Everywhere:        Note to Abstraction: If this section is blank, no results were found via Chart Review/Care Everywhere.      Valerie Norton MA

## 2022-07-18 ENCOUNTER — NURSE TRIAGE (OUTPATIENT)
Dept: FAMILY MEDICINE | Facility: CLINIC | Age: 57
End: 2022-07-18

## 2022-07-18 NOTE — TELEPHONE ENCOUNTER
Pt requests sooner follow up visit as her BP has not gone down.    [phone] is the best number to reach her.

## 2022-07-18 NOTE — TELEPHONE ENCOUNTER
Called patient for BP follow up no answer answer.  Left VM to call back for BP triage.    Hawa Paz RN

## 2022-07-18 NOTE — TELEPHONE ENCOUNTER
Please have her see team and then schedule follow-up with me in about 6 weeks.    Thank you    Lencho Goldberg M.D.

## 2022-07-18 NOTE — TELEPHONE ENCOUNTER
This is not emergent as long as no chest pain or shortness of breath.  I have not seen her in person in long time, should schedule follow up office visit with me, please do in next 2 weeks, if nothing available let me know please    Lencho Goldberg M.D.

## 2022-07-18 NOTE — TELEPHONE ENCOUNTER
"    Nurse Triage SBAR    Is this a 2nd Level Triage? YES, LICENSED PRACTITIONER REVIEW IS REQUIRED    Situation: Home BP readings range from -150/ -87. Pt states she has been checking BP in AM. Pt denies any chest pain, SOB, h/a, blurred vision.     Background: Pt states BP elevated at last PCP visit and was told to monitor since. No other previous hx of HTN.    Assessment: See below    Protocol Recommended Disposition:   See in office today     Recommendation: See in office today    Routed to provider    Does the patient meet one of the following criteria for ADS visit consideration? 16+ years old, with an [facility] PCP     TIP  Providers, please consider if this condition is appropriate for management at one of our Acute and Diagnostic Services sites.     If patient is a good candidate, please use dotphrase <dot>triageresponse and select Refer to ADS to document.      Reason for Disposition    Patient wants to be seen    Additional Information    Negative: Sounds like a life-threatening emergency to the triager    Negative: Pregnant > 20 weeks or postpartum (< 6 weeks after delivery) and new hand or face swelling    Negative: Pregnant > 20 weeks and BP > 140/90    Negative: Systolic BP >= 160 OR Diastolic >= 100, and any cardiac or neurologic symptoms (e.g., chest pain, difficulty breathing, unsteady gait, blurred vision)    Negative: Patient sounds very sick or weak to the triager    Negative: BP Systolic BP >= 140 OR Diastolic >= 90 and postpartum (from 0 to 6 weeks after delivery)    Negative: Systolic BP >= 180 OR Diastolic >= 110, and missed most recent dose of blood pressure medication    Negative: Systolic BP >= 180 OR Diastolic >= 110    Answer Assessment - Initial Assessment Questions  1. BLOOD PRESSURE: \"What is the blood pressure?\" \"Did you take at least two measurements 5 minutes apart?\"      167/98 this Morning    2. ONSET: \"When did you take your blood pressure?\"     Every morning    3. " "HOW: \"How did you obtain the blood pressure?\" (e.g., visiting nurse, automatic home BP monitor)      Home automatic BP monitor   4. HISTORY: \"Do you have a history of high blood pressure?\"      Just recently since last visit with PCP    5. MEDICATIONS: \"Are you taking any medications for blood pressure?\" \"Have you missed any doses recently?\"      No    6. OTHER SYMPTOMS: \"Do you have any symptoms?\" (e.g., headache, chest pain, blurred vision, difficulty breathing, weakness)      No    7. PREGNANCY: \"Is there any chance you are pregnant?\" \"When was your last menstrual period?\"      No    Protocols used: HIGH BLOOD PRESSURE-A-OH    CALL BACK IF:  * Headache, blurred vision, difficulty talking, or difficulty walking occurs  * Chest pain or difficulty breathing occurs  * You want to go into the office for a blood pressure check  * You become worse      Ashley Paris RN  Mon Jul 18, 2022 10:19 AM   SEE IN OFFICE TODAY:   * You need to be examined today. Let me give you an appointment.  * IF NO AVAILABLE APPOINTMENTS: You need to be seen in the Urgent Care Center. Go to the one at . Go there today. A nearby Urgent Care Center is often a good source of care. Another choice is to go to the Emergency Department.        Patient/Caregiver understands and will follow care advice? Yes, plans to follow advice     Ashley MIJARES RN  EP Triage      "

## 2022-07-20 ENCOUNTER — OFFICE VISIT (OUTPATIENT)
Dept: FAMILY MEDICINE | Facility: CLINIC | Age: 57
End: 2022-07-20
Payer: COMMERCIAL

## 2022-07-20 VITALS
SYSTOLIC BLOOD PRESSURE: 150 MMHG | HEART RATE: 89 BPM | RESPIRATION RATE: 16 BRPM | HEIGHT: 63 IN | BODY MASS INDEX: 28.17 KG/M2 | WEIGHT: 159 LBS | DIASTOLIC BLOOD PRESSURE: 98 MMHG | OXYGEN SATURATION: 100 %

## 2022-07-20 DIAGNOSIS — I10 PRIMARY HYPERTENSION: Primary | ICD-10-CM

## 2022-07-20 PROCEDURE — 99213 OFFICE O/P EST LOW 20 MIN: CPT | Performed by: PHYSICIAN ASSISTANT

## 2022-07-20 RX ORDER — AMLODIPINE BESYLATE 2.5 MG/1
2.5 TABLET ORAL DAILY
Qty: 90 TABLET | Refills: 1 | Status: SHIPPED | OUTPATIENT
Start: 2022-07-20 | End: 2022-08-30

## 2022-07-20 NOTE — PROGRESS NOTES
"Assessment and Plan:     (I10) Primary hypertension  (primary encounter diagnosis)  Comment: cut down on salt, caffeine, continue to walk daily  Plan: amLODIPine (NORVASC) 2.5 MG tablet--side effects reviewed, she will take in evening  Cont to monitor at home and bring to next visit  Follow-up in 6 weeks as planned      Heather Perales PA-C        Subjective   Nasrin is a 57 year old, presenting for the following health issues:  No chief complaint on file.      HPI     Follow up on blood pressure, Home BP readings range from -150/ -87 over the last month    She saw Dr. Goldberg a few months ago and BP was high, she was told to monitor it at home  She has been monitoring it at home, see above  She was also seen in the ED recently and it was high    She denies chest pain, sob, focal weakness, headache  Both parents have HTN    Review of Systems   See above      Objective      BP (!) 150/98   Pulse 89   Resp 16   Ht 1.588 m (5' 2.5\")   Wt 72.1 kg (159 lb)   SpO2 100%   BMI 28.62 kg/m        Physical Exam     GENERAL: healthy, alert and no distress  RESP: lungs clear to auscultation - no rales, no rhonchi, no wheezes  CV: regular rates and rhythm, normal S1 S2, no S3 or S4 and no murmur, no click or rub   MS: extremities- no gross deformities noted, no edema                .  ..  "

## 2022-07-20 NOTE — PATIENT INSTRUCTIONS
Work on cutting down on salt and caffeine     Start amlodipine today 2.5mg daily, continue to monitor blood pressure daily and record, bring recordings to next visit with Dr. Goldberg    Continue walking daily

## 2022-08-30 ENCOUNTER — OFFICE VISIT (OUTPATIENT)
Dept: FAMILY MEDICINE | Facility: CLINIC | Age: 57
End: 2022-08-30
Payer: COMMERCIAL

## 2022-08-30 VITALS
OXYGEN SATURATION: 99 % | BODY MASS INDEX: 28.62 KG/M2 | HEIGHT: 63 IN | SYSTOLIC BLOOD PRESSURE: 136 MMHG | HEART RATE: 95 BPM | DIASTOLIC BLOOD PRESSURE: 84 MMHG

## 2022-08-30 DIAGNOSIS — R03.0 ELEVATED BLOOD PRESSURE READING WITHOUT DIAGNOSIS OF HYPERTENSION: ICD-10-CM

## 2022-08-30 DIAGNOSIS — I10 PRIMARY HYPERTENSION: Primary | ICD-10-CM

## 2022-08-30 PROCEDURE — 99213 OFFICE O/P EST LOW 20 MIN: CPT | Performed by: INTERNAL MEDICINE

## 2022-08-30 RX ORDER — AMLODIPINE BESYLATE 5 MG/1
5 TABLET ORAL DAILY
Qty: 90 TABLET | Refills: 3 | Status: SHIPPED | OUTPATIENT
Start: 2022-08-30 | End: 2023-07-25

## 2022-08-30 ASSESSMENT — PAIN SCALES - GENERAL: PAINLEVEL: NO PAIN (0)

## 2022-08-30 NOTE — PROGRESS NOTES
This is a follow-up visit for her hypertension for which she saw the PA on July 20 in which amlodipine was added for 2.5 mg.  Prior labs have been fine.  She does not smoke or drink excessively.  She was having a fair amount of salt and some caffeine but no NSAIDs.  There is a strong family history of hypertension.    She has tolerated amlodipine fine and her blood pressure on her checks is better and today it is even better than that.  She denies headaches or dizziness.  No chest pain or shortness of breath or edema.  She does work out by walking the dogs.    She is on hormone therapy as noted.    Past Medical History:   Diagnosis Date     Chest pain 02/2019    nuclear est nl done at [facility]     Elevated blood pressure reading without diagnosis of hypertension (aka BP)      Primary hypertension 07/2022    norvasc started then     Tubular adenoma of colon 02/2016    fu 5 years, fu done 2021 and one 3mm tubular adenoma, fu 5 years     Past Surgical History:   Procedure Laterality Date     COLONOSCOPY N/A 2/22/2016    Procedure: COMBINED COLONOSCOPY, SINGLE OR MULTIPLE BIOPSY/POLYPECTOMY BY BIOPSY;  Surgeon: Carie Barnes MD;  Location:  GI     COLONOSCOPY N/A 10/27/2021    Procedure: COLONOSCOPY, FLEXIBLE, WITH LESION REMOVAL USING SNARE;  Surgeon: Carie Barnes MD;  Location:  GI     KNEE SURGERY [employer]     Social History     Socioeconomic History     Marital status:      Spouse name: Not on file     Number of children: 3     Years of education: Not on file     Highest education level: Not on file   Occupational History     Occupation: volunteer work   Tobacco Use     Smoking status: Never Smoker     Smokeless tobacco: Never Used   Substance and Sexual Activity     Alcohol use: Yes     Alcohol/week: 4.2 standard drinks     Types: 5 Standard drinks or equivalent per week     Comment: 5 drinks a week      Drug use: No     Sexual activity: Yes     Partners: Male   Other Topics Concern  "    Parent/sibling w/ CABG, MI or angioplasty before 65F 55M? Not Asked   Social History Narrative     Not on file     Social Determinants of Health     Financial Resource Strain: Not on file   Food Insecurity: Not on file   Transportation Needs: Not on file   Physical Activity: Not on file   Stress: Not on file   Social Connections: Not on file   Intimate Partner Violence: Not on file   Housing Stability: Not on file     Current Outpatient Medications   Medication Sig Dispense Refill     amLODIPine (NORVASC) 5 MG tablet Take 1 tablet (5 mg) by mouth daily 90 tablet 3     Ascorbic Acid (VITAMIN C PO)        CALCIUM PO        Cholecalciferol (VITAMIN D3 PO) Take by mouth daily       estradiol (ESTRACE) 1 MG tablet Take 1 mg by mouth daily       Omega-3 Fatty Acids (FISH OIL PO)        progesterone (PROMETRIUM) 200 MG capsule TAKE 1 CAPSULE BY MOUTH ON DAYS 1-12 EACH MONTH       No Known Allergies  FAMILY HISTORY NOTED AND REVIEWED    REVIEW OF SYSTEMS: above    PHYSICAL EXAM    /84   Pulse 95   Ht 1.588 m (5' 2.5\")   SpO2 99%   BMI 28.62 kg/m    Blood pressure same bilat  Patient appears non toxic  cv reglar rate and rhythm    ASSESSMENT:  Hypertension, doubt secondary cause, likely due to family history, weight, salt intake.  Blood pressure ok now    hrt use, discussed with patient risk of clots and strokes especially with hypertension.    PLAN:  She will discuss with gyn getting off hrt  Change norvasc to 5mg  Has follow up for pre op in Oct, labs then    Lencho Goldberg M.D.          "

## 2022-08-30 NOTE — PATIENT INSTRUCTIONS
Change the dose of your amlodipine to 5mg once daily.    I would suggest speaking with the gyn about getting of the hormones    Lencho Goldberg M.D.

## 2022-10-16 ENCOUNTER — HEALTH MAINTENANCE LETTER (OUTPATIENT)
Age: 57
End: 2022-10-16

## 2022-10-18 ENCOUNTER — OFFICE VISIT (OUTPATIENT)
Dept: FAMILY MEDICINE | Facility: CLINIC | Age: 57
End: 2022-10-18
Payer: COMMERCIAL

## 2022-10-18 VITALS
BODY MASS INDEX: 28.62 KG/M2 | SYSTOLIC BLOOD PRESSURE: 138 MMHG | DIASTOLIC BLOOD PRESSURE: 88 MMHG | HEIGHT: 63 IN | HEART RATE: 95 BPM | TEMPERATURE: 97.7 F | RESPIRATION RATE: 16 BRPM | OXYGEN SATURATION: 98 %

## 2022-10-18 DIAGNOSIS — I10 PRIMARY HYPERTENSION: ICD-10-CM

## 2022-10-18 DIAGNOSIS — Z01.818 PRE-OP EXAMINATION: Primary | ICD-10-CM

## 2022-10-18 LAB
ANION GAP SERPL CALCULATED.3IONS-SCNC: 5 MMOL/L (ref 3–14)
BUN SERPL-MCNC: 18 MG/DL (ref 7–30)
CALCIUM SERPL-MCNC: 8.9 MG/DL (ref 8.5–10.1)
CHLORIDE BLD-SCNC: 106 MMOL/L (ref 94–109)
CHOLEST SERPL-MCNC: 264 MG/DL
CO2 SERPL-SCNC: 27 MMOL/L (ref 20–32)
CREAT SERPL-MCNC: 0.84 MG/DL (ref 0.52–1.04)
FASTING STATUS PATIENT QL REPORTED: YES
GFR SERPL CREATININE-BSD FRML MDRD: 81 ML/MIN/1.73M2
GLUCOSE BLD-MCNC: 97 MG/DL (ref 70–99)
HDLC SERPL-MCNC: 131 MG/DL
LDLC SERPL CALC-MCNC: 118 MG/DL
NONHDLC SERPL-MCNC: 133 MG/DL
POTASSIUM BLD-SCNC: 4.3 MMOL/L (ref 3.4–5.3)
SODIUM SERPL-SCNC: 138 MMOL/L (ref 133–144)
TRIGL SERPL-MCNC: 74 MG/DL

## 2022-10-18 PROCEDURE — 80048 BASIC METABOLIC PNL TOTAL CA: CPT | Performed by: INTERNAL MEDICINE

## 2022-10-18 PROCEDURE — 99214 OFFICE O/P EST MOD 30 MIN: CPT | Performed by: INTERNAL MEDICINE

## 2022-10-18 PROCEDURE — 80061 LIPID PANEL: CPT | Performed by: INTERNAL MEDICINE

## 2022-10-18 PROCEDURE — 36415 COLL VENOUS BLD VENIPUNCTURE: CPT | Performed by: INTERNAL MEDICINE

## 2022-10-18 ASSESSMENT — PAIN SCALES - GENERAL: PAINLEVEL: NO PAIN (0)

## 2022-10-18 NOTE — PROGRESS NOTES
[address], [address]  [facility] [address]  Phone: [phone]  Primary Provider: Lencho Goldberg  Pre-op Performing Provider: LENCHO GOLDBERG      PREOPERATIVE EVALUATION:  Today's date: 10/18/2022    Nasrin Zamora is a 57 year old female who presents for a preoperative evaluation.    Surgical Information:  General Information    Date: 10/31/2022 Time:  7:30 AM Status: Scheduled   Location:  OR Room: [place] [address] Service: Plastics & Reconstruction   Patient class: Same Day Surgery Case classification:       Panel Information    Panel 1    Provider Role   Staci Graves MD Primary    Procedure Laterality Anesthesia   BILATERAL BREAST REDUCTION Bilateral General            Where patient plans to recover: At home with family  Fax number for surgical facility: Note does not need to be faxed, will be available electronically in Epic.    Type of Anesthesia Anticipated: General        Subjective     HPI related to upcoming procedure:     A pleasant 57-year-old scheduled for reduction mammoplasty.  She otherwise has been doing quite well.  She can do 4 METS without difficulty.  Her blood pressure at home has been mostly in the 130s.  She has no complaints on review of systems.                Past Medical History:      Past Medical History:   Diagnosis Date     Chest pain 02/2019    nuclear est nl done at [facility]     Elevated blood pressure reading without diagnosis of hypertension (aka BP)      Primary hypertension 07/2022    norvasc started then     Tubular adenoma of colon 02/2016    fu 5 years, fu done 2021 and one 3mm tubular adenoma, fu 5 years             Past Surgical History:      Past Surgical History:   Procedure Laterality Date     COLONOSCOPY N/A 2/22/2016    Procedure: COMBINED COLONOSCOPY, SINGLE OR MULTIPLE BIOPSY/POLYPECTOMY BY BIOPSY;  Surgeon: Carie Barnes MD;  Location:  GI     COLONOSCOPY N/A 10/27/2021    Procedure: COLONOSCOPY, FLEXIBLE,  "WITH LESION REMOVAL USING SNARE;  Surgeon: Carie Barnes MD;  Location:  GI     KNEE SURGERY [employer]             Social History:     Social History     Tobacco Use     Smoking status: Never     Smokeless tobacco: Never   Substance Use Topics     Alcohol use: Yes     Alcohol/week: 4.2 standard drinks     Types: 5 Standard drinks or equivalent per week     Comment: 5 drinks a week              Family History:   No family history of bleeding difficulty, anesthesia problems or blood clots.         Allergies:   No Known Allergies          Medications:     Current Outpatient Medications   Medication Sig Dispense Refill     amLODIPine (NORVASC) 5 MG tablet Take 1 tablet (5 mg) by mouth daily 90 tablet 3     Ascorbic Acid (VITAMIN C PO)        CALCIUM PO        Cholecalciferol (VITAMIN D3 PO) Take by mouth daily       estradiol (ESTRACE) 1 MG tablet Take 1 mg by mouth daily       Omega-3 Fatty Acids (FISH OIL PO)        progesterone (PROMETRIUM) 200 MG capsule TAKE 1 CAPSULE BY MOUTH ON DAYS 1-12 EACH MONTH                 Review of Systems:     No history of bleeding difficulty, anesthesia problems or blood clots.  The 10 point Review of Systems is negative other than noted in the HPI           Physical Exam:   Blood pressure 138/88, pulse 95, temperature 97.7  F (36.5  C), temperature source Temporal, resp. rate 16, height 1.588 m (5' 2.5\"), SpO2 98 %.    Constitutional: healthy appearing, alert and in no distress  Heent: Normocephalic. Head without obvious masses or lesions. PERRLDC, EOMI. Mouth exam within normal limits: tongue, mucous membranes, posterior pharynx all normal, no lesions or abnormalities seen. Neck supple, no nuchal rigidity or masses. No supraclavicular, or cervical adenopathy. Thyroid symmetric, no masses.  Cardiovascular: Regular rate and rhythm, no murmer, rub or gallops.  JVP not elevated, no edema.  Carotids within normal limits bilaterally, no bruits.  Respiratory: Normal " respiratory effort.  Lungs clear, normal flow, no wheezing or crackles.  Gastrointestinal: Normal active bowel sounds.   Soft, not tender, no masses, guarding or rebound.  No hepatosplenomegaly.   Musculoskeletal: extremities normal, no gross deformities noted.  Skin: no suspicious lesions or rashes   Neurologic: Mental status within normal limits.  Speech fluent.  No gross motor abnormalities and gait intact.  Psychiatric: mentation appears normal and affect normal.         Data:   Labs sent        Assessment:   1. Normal pre op exam, this patient should be low risk for the procedure  2. Hypertension, controlled         Plan:   The patient is ok for the surgery  She can take her norvasc the evening prior as she usually does  She wants to defer on covid booster and flu shot today      Lencho Goldberg M.D.

## 2022-10-18 NOTE — RESULT ENCOUNTER NOTE
It was nice to see you.  Your should be able to view the lab results.    Your tests look super.  Your blood salts, sugar test, and kidney tests are normal.    Your total cholesterol is high but you have a fabulous amount of good cholesterol or HDL and your bad or LDL is also just fine.    Please let me know if you have any questions.    Lencho Goldberg M.D.

## 2022-10-18 NOTE — H&P (VIEW-ONLY)
[address], [address]  [facility] [address]  Phone: [phone]  Primary Provider: Lencho Goldberg  Pre-op Performing Provider: LENCHO GOLDBERG      PREOPERATIVE EVALUATION:  Today's date: 10/18/2022    Nasrin Zamora is a 57 year old female who presents for a preoperative evaluation.    Surgical Information:  General Information    Date: 10/31/2022 Time:  7:30 AM Status: Scheduled   Location:  OR Room: [place] [address] Service: Plastics & Reconstruction   Patient class: Same Day Surgery Case classification:       Panel Information    Panel 1    Provider Role   Staci Graves MD Primary    Procedure Laterality Anesthesia   BILATERAL BREAST REDUCTION Bilateral General            Where patient plans to recover: At home with family  Fax number for surgical facility: Note does not need to be faxed, will be available electronically in Epic.    Type of Anesthesia Anticipated: General        Subjective     HPI related to upcoming procedure:     A pleasant 57-year-old scheduled for reduction mammoplasty.  She otherwise has been doing quite well.  She can do 4 METS without difficulty.  Her blood pressure at home has been mostly in the 130s.  She has no complaints on review of systems.                Past Medical History:      Past Medical History:   Diagnosis Date     Chest pain 02/2019    nuclear est nl done at [facility]     Elevated blood pressure reading without diagnosis of hypertension (aka BP)      Primary hypertension 07/2022    norvasc started then     Tubular adenoma of colon 02/2016    fu 5 years, fu done 2021 and one 3mm tubular adenoma, fu 5 years             Past Surgical History:      Past Surgical History:   Procedure Laterality Date     COLONOSCOPY N/A 2/22/2016    Procedure: COMBINED COLONOSCOPY, SINGLE OR MULTIPLE BIOPSY/POLYPECTOMY BY BIOPSY;  Surgeon: Carie Barnes MD;  Location:  GI     COLONOSCOPY N/A 10/27/2021    Procedure: COLONOSCOPY, FLEXIBLE,  "WITH LESION REMOVAL USING SNARE;  Surgeon: Carie Barnes MD;  Location:  GI     KNEE SURGERY [employer]             Social History:     Social History     Tobacco Use     Smoking status: Never     Smokeless tobacco: Never   Substance Use Topics     Alcohol use: Yes     Alcohol/week: 4.2 standard drinks     Types: 5 Standard drinks or equivalent per week     Comment: 5 drinks a week              Family History:   No family history of bleeding difficulty, anesthesia problems or blood clots.         Allergies:   No Known Allergies          Medications:     Current Outpatient Medications   Medication Sig Dispense Refill     amLODIPine (NORVASC) 5 MG tablet Take 1 tablet (5 mg) by mouth daily 90 tablet 3     Ascorbic Acid (VITAMIN C PO)        CALCIUM PO        Cholecalciferol (VITAMIN D3 PO) Take by mouth daily       estradiol (ESTRACE) 1 MG tablet Take 1 mg by mouth daily       Omega-3 Fatty Acids (FISH OIL PO)        progesterone (PROMETRIUM) 200 MG capsule TAKE 1 CAPSULE BY MOUTH ON DAYS 1-12 EACH MONTH                 Review of Systems:     No history of bleeding difficulty, anesthesia problems or blood clots.  The 10 point Review of Systems is negative other than noted in the HPI           Physical Exam:   Blood pressure 138/88, pulse 95, temperature 97.7  F (36.5  C), temperature source Temporal, resp. rate 16, height 1.588 m (5' 2.5\"), SpO2 98 %.    Constitutional: healthy appearing, alert and in no distress  Heent: Normocephalic. Head without obvious masses or lesions. PERRLDC, EOMI. Mouth exam within normal limits: tongue, mucous membranes, posterior pharynx all normal, no lesions or abnormalities seen. Neck supple, no nuchal rigidity or masses. No supraclavicular, or cervical adenopathy. Thyroid symmetric, no masses.  Cardiovascular: Regular rate and rhythm, no murmer, rub or gallops.  JVP not elevated, no edema.  Carotids within normal limits bilaterally, no bruits.  Respiratory: Normal " respiratory effort.  Lungs clear, normal flow, no wheezing or crackles.  Gastrointestinal: Normal active bowel sounds.   Soft, not tender, no masses, guarding or rebound.  No hepatosplenomegaly.   Musculoskeletal: extremities normal, no gross deformities noted.  Skin: no suspicious lesions or rashes   Neurologic: Mental status within normal limits.  Speech fluent.  No gross motor abnormalities and gait intact.  Psychiatric: mentation appears normal and affect normal.         Data:   Labs sent        Assessment:   1. Normal pre op exam, this patient should be low risk for the procedure  2. Hypertension, controlled         Plan:   The patient is ok for the surgery  She can take her norvasc the evening prior as she usually does  She wants to defer on covid booster and flu shot today      Lencho Goldberg M.D.

## 2022-10-27 ENCOUNTER — TRANSFERRED RECORDS (OUTPATIENT)
Dept: HEALTH INFORMATION MANAGEMENT | Facility: CLINIC | Age: 57
End: 2022-10-27

## 2022-10-30 ENCOUNTER — ANESTHESIA EVENT (OUTPATIENT)
Dept: SURGERY | Facility: CLINIC | Age: 57
End: 2022-10-30
Payer: COMMERCIAL

## 2022-10-31 ENCOUNTER — HOSPITAL ENCOUNTER (OUTPATIENT)
Facility: CLINIC | Age: 57
Discharge: HOME | End: 2022-10-31
Attending: PLASTIC SURGERY | Admitting: PLASTIC SURGERY
Payer: COMMERCIAL

## 2022-10-31 ENCOUNTER — ANESTHESIA (OUTPATIENT)
Dept: SURGERY | Facility: CLINIC | Age: 57
End: 2022-10-31
Payer: COMMERCIAL

## 2022-10-31 VITALS
HEIGHT: 62 IN | SYSTOLIC BLOOD PRESSURE: 145 MMHG | BODY MASS INDEX: 28.52 KG/M2 | RESPIRATION RATE: 16 BRPM | OXYGEN SATURATION: 100 % | TEMPERATURE: 96.7 F | WEIGHT: 155 LBS | DIASTOLIC BLOOD PRESSURE: 81 MMHG | HEART RATE: 61 BPM

## 2022-10-31 DIAGNOSIS — Z98.890 STATUS POST BREAST REDUCTION: Primary | ICD-10-CM

## 2022-10-31 PROCEDURE — 88305 TISSUE EXAM BY PATHOLOGIST: CPT | Mod: TC | Performed by: PLASTIC SURGERY

## 2022-10-31 PROCEDURE — 272N000001 HC OR GENERAL SUPPLY STERILE: Performed by: PLASTIC SURGERY

## 2022-10-31 PROCEDURE — 250N000011 HC RX IP 250 OP 636: Performed by: PHYSICIAN ASSISTANT

## 2022-10-31 PROCEDURE — 360N000076 HC SURGERY LEVEL 3, PER MIN: Performed by: PLASTIC SURGERY

## 2022-10-31 PROCEDURE — 250N000009 HC RX 250: Performed by: PLASTIC SURGERY

## 2022-10-31 PROCEDURE — 250N000013 HC RX MED GY IP 250 OP 250 PS 637: Performed by: PLASTIC SURGERY

## 2022-10-31 PROCEDURE — 999N000141 HC STATISTIC PRE-PROCEDURE NURSING ASSESSMENT: Performed by: PLASTIC SURGERY

## 2022-10-31 PROCEDURE — 88305 TISSUE EXAM BY PATHOLOGIST: CPT | Mod: 26 | Performed by: PATHOLOGY

## 2022-10-31 PROCEDURE — 250N000025 HC SEVOFLURANE, PER MIN: Performed by: PLASTIC SURGERY

## 2022-10-31 PROCEDURE — 258N000003 HC RX IP 258 OP 636

## 2022-10-31 PROCEDURE — 250N000013 HC RX MED GY IP 250 OP 250 PS 637: Performed by: PHYSICIAN ASSISTANT

## 2022-10-31 PROCEDURE — 710N000009 HC RECOVERY PHASE 1, LEVEL 1, PER MIN: Performed by: PLASTIC SURGERY

## 2022-10-31 PROCEDURE — 250N000009 HC RX 250

## 2022-10-31 PROCEDURE — 710N000012 HC RECOVERY PHASE 2, PER MINUTE: Performed by: PLASTIC SURGERY

## 2022-10-31 PROCEDURE — 250N000011 HC RX IP 250 OP 636: Performed by: ANESTHESIOLOGY

## 2022-10-31 PROCEDURE — 250N000011 HC RX IP 250 OP 636

## 2022-10-31 PROCEDURE — 250N000011 HC RX IP 250 OP 636: Performed by: PLASTIC SURGERY

## 2022-10-31 PROCEDURE — 370N000017 HC ANESTHESIA TECHNICAL FEE, PER MIN: Performed by: PLASTIC SURGERY

## 2022-10-31 RX ORDER — FENTANYL CITRATE 50 UG/ML
25 INJECTION, SOLUTION INTRAMUSCULAR; INTRAVENOUS ONCE
Status: COMPLETED | OUTPATIENT
Start: 2022-10-31 | End: 2022-10-31

## 2022-10-31 RX ORDER — KETOROLAC TROMETHAMINE 30 MG/ML
INJECTION, SOLUTION INTRAMUSCULAR; INTRAVENOUS PRN
Status: DISCONTINUED | OUTPATIENT
Start: 2022-10-31 | End: 2022-10-31

## 2022-10-31 RX ORDER — NEOSTIGMINE METHYLSULFATE 1 MG/ML
VIAL (ML) INJECTION PRN
Status: DISCONTINUED | OUTPATIENT
Start: 2022-10-31 | End: 2022-10-31

## 2022-10-31 RX ORDER — DEXMEDETOMIDINE HYDROCHLORIDE 4 UG/ML
INJECTION, SOLUTION INTRAVENOUS PRN
Status: DISCONTINUED | OUTPATIENT
Start: 2022-10-31 | End: 2022-10-31

## 2022-10-31 RX ORDER — FENTANYL CITRATE 50 UG/ML
INJECTION, SOLUTION INTRAMUSCULAR; INTRAVENOUS PRN
Status: DISCONTINUED | OUTPATIENT
Start: 2022-10-31 | End: 2022-10-31

## 2022-10-31 RX ORDER — GLYCOPYRROLATE 0.2 MG/ML
INJECTION, SOLUTION INTRAMUSCULAR; INTRAVENOUS PRN
Status: DISCONTINUED | OUTPATIENT
Start: 2022-10-31 | End: 2022-10-31

## 2022-10-31 RX ORDER — ONDANSETRON 2 MG/ML
INJECTION INTRAMUSCULAR; INTRAVENOUS PRN
Status: DISCONTINUED | OUTPATIENT
Start: 2022-10-31 | End: 2022-10-31

## 2022-10-31 RX ORDER — ONDANSETRON 2 MG/ML
4 INJECTION INTRAMUSCULAR; INTRAVENOUS ONCE
Status: COMPLETED | OUTPATIENT
Start: 2022-10-31 | End: 2022-10-31

## 2022-10-31 RX ORDER — HYDROMORPHONE HYDROCHLORIDE 1 MG/ML
INJECTION, SOLUTION INTRAMUSCULAR; INTRAVENOUS; SUBCUTANEOUS PRN
Status: DISCONTINUED | OUTPATIENT
Start: 2022-10-31 | End: 2022-10-31

## 2022-10-31 RX ORDER — CEFAZOLIN SODIUM/WATER 2 G/20 ML
2 SYRINGE (ML) INTRAVENOUS SEE ADMIN INSTRUCTIONS
Status: DISCONTINUED | OUTPATIENT
Start: 2022-10-31 | End: 2022-10-31 | Stop reason: HOSPADM

## 2022-10-31 RX ORDER — CEFAZOLIN SODIUM/WATER 2 G/20 ML
2 SYRINGE (ML) INTRAVENOUS
Status: COMPLETED | OUTPATIENT
Start: 2022-10-31 | End: 2022-10-31

## 2022-10-31 RX ORDER — OXYCODONE HYDROCHLORIDE 5 MG/1
5 TABLET ORAL EVERY 4 HOURS PRN
Status: DISCONTINUED | OUTPATIENT
Start: 2022-10-31 | End: 2022-10-31 | Stop reason: HOSPADM

## 2022-10-31 RX ORDER — ACETAMINOPHEN 325 MG/1
650 TABLET ORAL
Status: DISCONTINUED | OUTPATIENT
Start: 2022-10-31 | End: 2022-10-31 | Stop reason: HOSPADM

## 2022-10-31 RX ORDER — ONDANSETRON 4 MG/1
4 TABLET, ORALLY DISINTEGRATING ORAL EVERY 30 MIN PRN
Status: DISCONTINUED | OUTPATIENT
Start: 2022-10-31 | End: 2022-10-31 | Stop reason: HOSPADM

## 2022-10-31 RX ORDER — DEXAMETHASONE SODIUM PHOSPHATE 4 MG/ML
INJECTION, SOLUTION INTRA-ARTICULAR; INTRALESIONAL; INTRAMUSCULAR; INTRAVENOUS; SOFT TISSUE PRN
Status: DISCONTINUED | OUTPATIENT
Start: 2022-10-31 | End: 2022-10-31

## 2022-10-31 RX ORDER — SODIUM CHLORIDE, SODIUM LACTATE, POTASSIUM CHLORIDE, CALCIUM CHLORIDE 600; 310; 30; 20 MG/100ML; MG/100ML; MG/100ML; MG/100ML
INJECTION, SOLUTION INTRAVENOUS CONTINUOUS PRN
Status: DISCONTINUED | OUTPATIENT
Start: 2022-10-31 | End: 2022-10-31

## 2022-10-31 RX ORDER — PROPOFOL 10 MG/ML
INJECTION, EMULSION INTRAVENOUS CONTINUOUS PRN
Status: DISCONTINUED | OUTPATIENT
Start: 2022-10-31 | End: 2022-10-31

## 2022-10-31 RX ORDER — MAGNESIUM HYDROXIDE 1200 MG/15ML
LIQUID ORAL PRN
Status: DISCONTINUED | OUTPATIENT
Start: 2022-10-31 | End: 2022-10-31 | Stop reason: HOSPADM

## 2022-10-31 RX ORDER — AMOXICILLIN 250 MG
1-2 CAPSULE ORAL 2 TIMES DAILY
Qty: 20 TABLET | Refills: 0 | Status: SHIPPED | OUTPATIENT
Start: 2022-10-31 | End: 2023-02-27

## 2022-10-31 RX ORDER — GABAPENTIN 600 MG/1
600 TABLET ORAL ONCE
Status: COMPLETED | OUTPATIENT
Start: 2022-10-31 | End: 2022-10-31

## 2022-10-31 RX ORDER — LIDOCAINE HYDROCHLORIDE 20 MG/ML
INJECTION, SOLUTION INFILTRATION; PERINEURAL PRN
Status: DISCONTINUED | OUTPATIENT
Start: 2022-10-31 | End: 2022-10-31

## 2022-10-31 RX ORDER — ONDANSETRON 2 MG/ML
4 INJECTION INTRAMUSCULAR; INTRAVENOUS EVERY 30 MIN PRN
Status: DISCONTINUED | OUTPATIENT
Start: 2022-10-31 | End: 2022-10-31 | Stop reason: HOSPADM

## 2022-10-31 RX ORDER — SODIUM CHLORIDE, SODIUM LACTATE, POTASSIUM CHLORIDE, CALCIUM CHLORIDE 600; 310; 30; 20 MG/100ML; MG/100ML; MG/100ML; MG/100ML
INJECTION, SOLUTION INTRAVENOUS CONTINUOUS
Status: DISCONTINUED | OUTPATIENT
Start: 2022-10-31 | End: 2022-10-31 | Stop reason: HOSPADM

## 2022-10-31 RX ORDER — OXYCODONE HYDROCHLORIDE 5 MG/1
5-10 TABLET ORAL EVERY 4 HOURS PRN
Qty: 20 TABLET | Refills: 0 | Status: SHIPPED | OUTPATIENT
Start: 2022-10-31 | End: 2023-02-27

## 2022-10-31 RX ORDER — FENTANYL CITRATE 0.05 MG/ML
50 INJECTION, SOLUTION INTRAMUSCULAR; INTRAVENOUS EVERY 5 MIN PRN
Status: DISCONTINUED | OUTPATIENT
Start: 2022-10-31 | End: 2022-10-31 | Stop reason: HOSPADM

## 2022-10-31 RX ORDER — EPHEDRINE SULFATE 50 MG/ML
INJECTION, SOLUTION INTRAMUSCULAR; INTRAVENOUS; SUBCUTANEOUS PRN
Status: DISCONTINUED | OUTPATIENT
Start: 2022-10-31 | End: 2022-10-31

## 2022-10-31 RX ORDER — FENTANYL CITRATE 0.05 MG/ML
25 INJECTION, SOLUTION INTRAMUSCULAR; INTRAVENOUS
Status: DISCONTINUED | OUTPATIENT
Start: 2022-10-31 | End: 2022-10-31 | Stop reason: HOSPADM

## 2022-10-31 RX ORDER — OXYCODONE HYDROCHLORIDE 5 MG/1
5 TABLET ORAL
Status: COMPLETED | OUTPATIENT
Start: 2022-10-31 | End: 2022-10-31

## 2022-10-31 RX ORDER — ONDANSETRON 4 MG/1
4 TABLET, ORALLY DISINTEGRATING ORAL
Status: COMPLETED | OUTPATIENT
Start: 2022-10-31 | End: 2022-10-31

## 2022-10-31 RX ORDER — ACETAMINOPHEN 500 MG
1000 TABLET ORAL ONCE
Status: COMPLETED | OUTPATIENT
Start: 2022-10-31 | End: 2022-10-31

## 2022-10-31 RX ORDER — PROPOFOL 10 MG/ML
INJECTION, EMULSION INTRAVENOUS PRN
Status: DISCONTINUED | OUTPATIENT
Start: 2022-10-31 | End: 2022-10-31

## 2022-10-31 RX ORDER — HYDROMORPHONE HCL IN WATER/PF 6 MG/30 ML
0.2 PATIENT CONTROLLED ANALGESIA SYRINGE INTRAVENOUS EVERY 5 MIN PRN
Status: DISCONTINUED | OUTPATIENT
Start: 2022-10-31 | End: 2022-10-31 | Stop reason: HOSPADM

## 2022-10-31 RX ADMIN — ONDANSETRON 4 MG: 4 TABLET, ORALLY DISINTEGRATING ORAL at 14:04

## 2022-10-31 RX ADMIN — PROPOFOL 200 MG: 10 INJECTION, EMULSION INTRAVENOUS at 07:24

## 2022-10-31 RX ADMIN — MIDAZOLAM 2 MG: 1 INJECTION INTRAMUSCULAR; INTRAVENOUS at 07:20

## 2022-10-31 RX ADMIN — DEXMEDETOMIDINE HYDROCHLORIDE 12 MCG: 200 INJECTION INTRAVENOUS at 07:55

## 2022-10-31 RX ADMIN — GABAPENTIN 600 MG: 600 TABLET, FILM COATED ORAL at 06:26

## 2022-10-31 RX ADMIN — SODIUM CHLORIDE, POTASSIUM CHLORIDE, SODIUM LACTATE AND CALCIUM CHLORIDE: 600; 310; 30; 20 INJECTION, SOLUTION INTRAVENOUS at 10:21

## 2022-10-31 RX ADMIN — SODIUM CHLORIDE, POTASSIUM CHLORIDE, SODIUM LACTATE AND CALCIUM CHLORIDE: 600; 310; 30; 20 INJECTION, SOLUTION INTRAVENOUS at 07:20

## 2022-10-31 RX ADMIN — HYDROMORPHONE HYDROCHLORIDE 0.2 MG: 0.2 INJECTION, SOLUTION INTRAMUSCULAR; INTRAVENOUS; SUBCUTANEOUS at 12:06

## 2022-10-31 RX ADMIN — GLYCOPYRROLATE 0.5 MG: 0.2 INJECTION, SOLUTION INTRAMUSCULAR; INTRAVENOUS at 10:53

## 2022-10-31 RX ADMIN — PHENYLEPHRINE HYDROCHLORIDE 100 MCG: 10 INJECTION INTRAVENOUS at 08:04

## 2022-10-31 RX ADMIN — PHENYLEPHRINE HYDROCHLORIDE 100 MCG: 10 INJECTION INTRAVENOUS at 10:43

## 2022-10-31 RX ADMIN — ACETAMINOPHEN 650 MG: 325 TABLET ORAL at 12:35

## 2022-10-31 RX ADMIN — OXYCODONE HYDROCHLORIDE 5 MG: 5 TABLET ORAL at 12:35

## 2022-10-31 RX ADMIN — ONDANSETRON 4 MG: 2 INJECTION INTRAMUSCULAR; INTRAVENOUS at 17:20

## 2022-10-31 RX ADMIN — DEXAMETHASONE SODIUM PHOSPHATE 4 MG: 4 INJECTION, SOLUTION INTRA-ARTICULAR; INTRALESIONAL; INTRAMUSCULAR; INTRAVENOUS; SOFT TISSUE at 07:31

## 2022-10-31 RX ADMIN — OXYCODONE HYDROCHLORIDE 5 MG: 5 TABLET ORAL at 17:21

## 2022-10-31 RX ADMIN — Medication 5 MG: at 08:21

## 2022-10-31 RX ADMIN — NEOSTIGMINE METHYLSULFATE 3.5 MG: 1 INJECTION, SOLUTION INTRAVENOUS at 10:53

## 2022-10-31 RX ADMIN — PHENYLEPHRINE HYDROCHLORIDE 100 MCG: 10 INJECTION INTRAVENOUS at 08:31

## 2022-10-31 RX ADMIN — Medication 5 MG: at 09:29

## 2022-10-31 RX ADMIN — FENTANYL CITRATE 50 MCG: 50 INJECTION, SOLUTION INTRAMUSCULAR; INTRAVENOUS at 11:34

## 2022-10-31 RX ADMIN — HYDROMORPHONE HYDROCHLORIDE 0.5 MG: 1 INJECTION, SOLUTION INTRAMUSCULAR; INTRAVENOUS; SUBCUTANEOUS at 07:47

## 2022-10-31 RX ADMIN — ACETAMINOPHEN 1000 MG: 500 TABLET ORAL at 06:26

## 2022-10-31 RX ADMIN — PHENYLEPHRINE HYDROCHLORIDE 50 MCG: 10 INJECTION INTRAVENOUS at 09:36

## 2022-10-31 RX ADMIN — HYDROMORPHONE HYDROCHLORIDE 0.2 MG: 0.2 INJECTION, SOLUTION INTRAMUSCULAR; INTRAVENOUS; SUBCUTANEOUS at 11:51

## 2022-10-31 RX ADMIN — DEXMEDETOMIDINE HYDROCHLORIDE 8 MCG: 200 INJECTION INTRAVENOUS at 07:44

## 2022-10-31 RX ADMIN — FENTANYL CITRATE 50 MCG: 50 INJECTION, SOLUTION INTRAMUSCULAR; INTRAVENOUS at 07:24

## 2022-10-31 RX ADMIN — PHENYLEPHRINE HYDROCHLORIDE 50 MCG: 10 INJECTION INTRAVENOUS at 08:22

## 2022-10-31 RX ADMIN — ONDANSETRON 4 MG: 2 INJECTION INTRAMUSCULAR; INTRAVENOUS at 10:14

## 2022-10-31 RX ADMIN — PROCHLORPERAZINE EDISYLATE 5 MG: 5 INJECTION INTRAMUSCULAR; INTRAVENOUS at 19:15

## 2022-10-31 RX ADMIN — HYDROMORPHONE HYDROCHLORIDE 0.2 MG: 0.2 INJECTION, SOLUTION INTRAMUSCULAR; INTRAVENOUS; SUBCUTANEOUS at 12:22

## 2022-10-31 RX ADMIN — KETOROLAC TROMETHAMINE 15 MG: 30 INJECTION, SOLUTION INTRAMUSCULAR at 10:59

## 2022-10-31 RX ADMIN — PHENYLEPHRINE HYDROCHLORIDE 100 MCG: 10 INJECTION INTRAVENOUS at 10:10

## 2022-10-31 RX ADMIN — ROCURONIUM BROMIDE 50 MG: 50 INJECTION, SOLUTION INTRAVENOUS at 07:24

## 2022-10-31 RX ADMIN — Medication 5 MG: at 09:36

## 2022-10-31 RX ADMIN — PHENYLEPHRINE HYDROCHLORIDE 50 MCG: 10 INJECTION INTRAVENOUS at 09:27

## 2022-10-31 RX ADMIN — FENTANYL CITRATE 25 MCG: 50 INJECTION, SOLUTION INTRAMUSCULAR; INTRAVENOUS at 18:39

## 2022-10-31 RX ADMIN — LIDOCAINE HYDROCHLORIDE 60 MG: 20 INJECTION, SOLUTION INFILTRATION; PERINEURAL at 07:24

## 2022-10-31 RX ADMIN — Medication 5 MG: at 08:09

## 2022-10-31 RX ADMIN — FENTANYL CITRATE 50 MCG: 50 INJECTION, SOLUTION INTRAMUSCULAR; INTRAVENOUS at 07:42

## 2022-10-31 RX ADMIN — PROPOFOL 30 MCG/KG/MIN: 10 INJECTION, EMULSION INTRAVENOUS at 07:24

## 2022-10-31 RX ADMIN — PHENYLEPHRINE HYDROCHLORIDE 100 MCG: 10 INJECTION INTRAVENOUS at 09:51

## 2022-10-31 RX ADMIN — PHENYLEPHRINE HYDROCHLORIDE 100 MCG: 10 INJECTION INTRAVENOUS at 10:54

## 2022-10-31 RX ADMIN — Medication 2 G: at 07:20

## 2022-10-31 RX ADMIN — Medication 5 MG: at 09:05

## 2022-10-31 ASSESSMENT — ACTIVITIES OF DAILY LIVING (ADL)
ADLS_ACUITY_SCORE: 35

## 2022-10-31 ASSESSMENT — LIFESTYLE VARIABLES: TOBACCO_USE: 0

## 2022-10-31 ASSESSMENT — ENCOUNTER SYMPTOMS: SEIZURES: 0

## 2022-10-31 NOTE — BRIEF OP NOTE
[facility]    Brief Operative Note    Pre-operative diagnosis: Macromastia [N62]  Post-operative diagnosis Same as pre-operative diagnosis    Procedure: Procedure(s):  BILATERAL BREAST REDUCTION  Surgeon: Surgeon(s) and Role:     * Staci Graves MD - Primary     * Joelle Hauser MD - Assisting  Anesthesia: General   Estimated Blood Loss: 20 mL from 10/31/2022  7:19 AM to 10/31/2022 11:26 AM      Drains: Remy-Tobias x 2  Specimens:   ID Type Source Tests Collected by Time Destination   1 : Left breast tissue Tissue Breast, Left SURGICAL PATHOLOGY EXAM Staci Graves MD 10/31/2022  7:49 AM    2 : Right breast tissue Tissue Breast, Right SURGICAL PATHOLOGY EXAM Staci Graves MD 10/31/2022  8:39 AM      Findings:   None.  Complications: None.  Implants: * No implants in log *

## 2022-10-31 NOTE — OP NOTE
PREOPERATIVE DIAGNOSIS:  Symptomatic macromastia.     POSTOPERATIVE DIAGNOSIS:  Symptomatic macromastia.     PROCEDURE:  Bilateral superior medial pedicle breast reduction (right breast total resected 405 grams, left breast total resected 320 grams).       DRAINAGE TYPE:  One 15-Maldivian channel drain to each breast.     ASSISTANT:  ISMA Trejo PA-C, was present and scrubbed for the entire procedure, and this is to include dissection, retraction and closure.  There were no other qualified trainees or other assistants available and the presence of Jennifer Hauser PA-C, was necessary due to inability to retract and dissect without an assistant.     INDICATIONS FOR PROCEDURE:  The patient is a 57 year old female with symptomatic macromastia.  We have obtained prior authorization to proceed with bilateral breast reductions of at least 420 grams per breast. A written witnessed informed consent was obtained preoperatively.     DESCRIPTION OF PROCEDURE:  The patient was identified and marked in the preoperative holding area for bilateral breast reduction using a superior medial pedicle. Of note, the right breast is larger than the left breast. We are planning a larger resection on the right side to improve symmetry. The intersection of the vertical limbs was marked at 21 cm from the sternal notch. A vertical limb length of 7 cm was marked.  She was taken to the operating room and placed supine on the operating table.  Sequential compression devices were applied to the lower extremities.  After successful general anesthesia and endotracheal intubation, the patient was prepared and draped in the usual sterile fashion.  An intentional pause was then performed, confirming the patient's identity, the procedure to be performed, the laterality, the patient's allergies, and the administration of the necessary antibiotics by Anesthesia.     I initially turned my attention to the left breast, the smaller side,  where the nipple areolar complex was marked with a 38 mm cookie cutter.  The same thing was done on the right side, and following this, the superior medial pedicle was marked on both sides.  Turning my attention to the left breast,  I then deepithelialized the superior medial pedicle. Following this, all the skin markings were incised through the dermis with a 10 blade.  The superior medial pedicle was then carved with the electrocautery and the tissue to be resected superiorly, laterally and inferiorly was resected en bloc.  The total resected breast tissue on the left side was 320 grams, leaving the breast close to a B cup. The resection was aborted here due to concerns for the blood supply to the nipple-areolar complex. Following this, hemostasis was confirmed, the left breast was irrigated with sterile saline solution and a 15-Swedish channel drain was placed, which was secured to the skin with 2-0 silk.  I then placed a Gillies suture at the T junction using 2-0 Vicryl in a 3-way fashion and the breast was temporarily closed with staples.      I then turned my attention to the right breast, the larger side, where here again the superior medial pedicle was deepithelialized. Following this, all the skin markings were incised through the dermis with a 10 blade.  The tissue to be resected superiorly, laterally and inferiorly was then resected en bloc. The breast was found to be extremely dense within and just above the pedicle, making it challenging to obtain symmetry without jeopardizing blood supply to the pedicle. The right breast was confirmed for hemostasis and irrigated with sterile saline solution.  A 15-Swedish channel drain was placed and secured to the skin with 2-0 silk. The amount of resected breast tissue was 405 grams. I removed as much breast tissue as possible without affecting the blood supply to the pedicle, while getting the breast size as close as possible too. The patient was then sat up to  compare symmetry. Several adjustments were made to the right side until breast symmetry was improved. In the sitting position, the position of the nipple areolar complexes was determined at 5.5 cm from the T junction, and marked with the 38 mm cookie cutter. The skin overlying the new position of the nipple areolar complex was then deepithelialized, and both nipple areolar complexes were exteriorized and inset. Both nipple areolar complexes were viable. Following this, I placed a 2-0 Vicryl Gillies suture at the T junction and we then proceeded to skin closure on both sides with deep dermal 3-0 Monocryl followed by 2-0 Monocryl Stratafix along the inframammary incision, deep dermal Monocryl, followed by 4-0 subcuticular Monocryl along the vertical incisions and the circumareolar incisions on both sides. Both breast specimens were sent to pathology.  All incisions were covered with Exofin fusion, ABD pads.  The Biopatch was placed around the drain site and covered with a Tegaderm.  The patient was placed in a surgical bra.  At the end of the procedure, all sponge, needle and instrument counts were correct.  The patient was awakened and sent to the recovery room in satisfactory condition.  I was present for the entire case.     Staci Graves MD

## 2022-10-31 NOTE — PROVIDER NOTIFICATION
MD Graves contacted again via cellphone and VM left informing MD we are waiting for response before discharge. Patient to be transported to Main PACU until discharge can be completed.

## 2022-10-31 NOTE — OR NURSING
Patient brought a picture of home covid antigen test on phone as directed.  I personally saw this result and it was time stamped 10/29/22.  Result was neg

## 2022-10-31 NOTE — ANESTHESIA CARE TRANSFER NOTE
Patient: Nasrin Zamora    Procedure: Procedure(s):  BILATERAL BREAST REDUCTION       Diagnosis: Macromastia [N62]  Diagnosis Additional Information: No value filed.    Anesthesia Type:   General     Note:    Oropharynx: oropharynx clear of all foreign objects and spontaneously breathing  Level of Consciousness: awake  Oxygen Supplementation: face mask  Level of Supplemental Oxygen (L/min / FiO2): 6  Independent Airway: airway patency satisfactory and stable  Dentition: dentition unchanged  Vital Signs Stable: post-procedure vital signs reviewed and stable  Report to RN Given: handoff report given  Patient transferred to: PACU    Handoff Report: Identifed the Patient, Identified the Reponsible Provider, Reviewed the pertinent medical history, Discussed the surgical course, Reviewed Intra-OP anesthesia mangement and issues during anesthesia, Set expectations for post-procedure period and Allowed opportunity for questions and acknowledgement of understanding      Vitals:  Vitals Value Taken Time   /83    Temp     Pulse 89 10/31/22 1131   Resp 11 10/31/22 1131   SpO2 99 % 10/31/22 1131   Vitals shown include unvalidated device data.    Electronically Signed By: JACKI Thao CRNA  October 31, 2022  11:32 AM

## 2022-10-31 NOTE — PROVIDER NOTIFICATION
[facility]i notified patient had an episode of emesis. Zofran order received.

## 2022-10-31 NOTE — ANESTHESIA POSTPROCEDURE EVALUATION
Patient: Nasrin Zamora    Procedure: Procedure(s):  BILATERAL BREAST REDUCTION       Anesthesia Type:  General    Note:  Disposition: Outpatient   Postop Pain Control: Uneventful            Sign Out: Well controlled pain   PONV: No   Neuro/Psych: Uneventful            Sign Out: Acceptable/Baseline neuro status   Airway/Respiratory: Uneventful            Sign Out: Acceptable/Baseline resp. status   CV/Hemodynamics: Uneventful            Sign Out: Acceptable CV status; No obvious hypovolemia; No obvious fluid overload   Other NRE: NONE   DID A NON-ROUTINE EVENT OCCUR?            Last vitals:  Vitals Value Taken Time   /76 10/31/22 1230   Temp 36.6  C (97.8  F) 10/31/22 1200   Pulse 82 10/31/22 1243   Resp 21 10/31/22 1243   SpO2 94 % 10/31/22 1243   Vitals shown include unvalidated device data.    Electronically Signed By: Minna Boo  October 31, 2022  2:43 PM

## 2022-10-31 NOTE — INTERVAL H&P NOTE
"I have reviewed the surgical (or preoperative) H&P that is linked to this encounter, and examined the patient. There are no significant changes    Clinical Conditions Present on Arrival:  Clinically Significant Risk Factors Present on Admission                    # Overweight: Estimated body mass index is 28.35 kg/m  as calculated from the following:    Height as of this encounter: 1.575 m (5' 2\").    Weight as of this encounter: 70.3 kg (155 lb).       "

## 2022-10-31 NOTE — ANESTHESIA PREPROCEDURE EVALUATION
Anesthesia Pre-Procedure Evaluation    Patient: Nasrin Zamora   MRN: [id number] : 1965        Procedure : Procedure(s):  BILATERAL BREAST REDUCTION          Past Medical History:   Diagnosis Date     Chest pain 2019    nuclear est nl done at [facility]     Elevated blood pressure reading without diagnosis of hypertension (aka BP)      Primary hypertension 2022    norvasc started then     Tubular adenoma of colon 2016    fu 5 years, fu done  and one 3mm tubular adenoma, fu 5 years      Past Surgical History:   Procedure Laterality Date     COLONOSCOPY N/A 2016    Procedure: COMBINED COLONOSCOPY, SINGLE OR MULTIPLE BIOPSY/POLYPECTOMY BY BIOPSY;  Surgeon: Carie Barnes MD;  Location:  GI     COLONOSCOPY N/A 10/27/2021    Procedure: COLONOSCOPY, FLEXIBLE, WITH LESION REMOVAL USING SNARE;  Surgeon: Carie Barnes MD;  Location:  GI     KNEE SURGERY [employer]      No Known Allergies   Social History     Tobacco Use     Smoking status: Never     Smokeless tobacco: Never   Substance Use Topics     Alcohol use: Yes     Alcohol/week: 4.2 standard drinks     Types: 5 Standard drinks or equivalent per week     Comment: 5 drinks a week       Wt Readings from Last 1 Encounters:   10/31/22 70.3 kg (155 lb)        Anesthesia Evaluation   Pt has had prior anesthetic.     No history of anesthetic complications       ROS/MED HX  ENT/Pulmonary:    (-) tobacco use, asthma and sleep apnea   Neurologic:    (-) no seizures and no CVA   Cardiovascular:     (+) hypertension-----    METS/Exercise Tolerance:     Hematologic:  - neg hematologic  ROS     Musculoskeletal:       GI/Hepatic:    (-) GERD   Renal/Genitourinary:  - neg Renal ROS     Endo:    (-) Type II DM and thyroid disease   Psychiatric/Substance Use:       Infectious Disease:       Malignancy:  - neg malignancy ROS     Other:            Physical Exam    Airway        Mallampati: I   TM distance: > 3 FB   Neck ROM: full   Mouth  opening: > 3 cm    Respiratory Devices and Support         Dental  no notable dental history         Cardiovascular   cardiovascular exam normal          Pulmonary                   OUTSIDE LABS:  CBC:   Lab Results   Component Value Date    WBC 5.6 05/08/2015    HGB 13.9 05/08/2015    HCT 40.7 05/08/2015     05/08/2015     BMP:   Lab Results   Component Value Date     10/18/2022     05/08/2015    POTASSIUM 4.3 10/18/2022    POTASSIUM 4.3 05/08/2015    CHLORIDE 106 10/18/2022    CHLORIDE 102 05/08/2015    CO2 27 10/18/2022    CO2 26 05/08/2015    BUN 18 10/18/2022    BUN 15 05/08/2015    CR 0.84 10/18/2022    CR 0.83 05/08/2015    GLC 97 10/18/2022    GLC 97 05/08/2015     COAGS: No results found for: PTT, INR, FIBR  POC: No results found for: BGM, HCG, HCGS  HEPATIC: No results found for: ALBUMIN, PROTTOTAL, ALT, AST, GGT, ALKPHOS, BILITOTAL, BILIDIRECT, SRINIVASAN  OTHER:   Lab Results   Component Value Date    JAMIE 8.9 10/18/2022    TSH 2.44 02/25/2019       Anesthesia Plan    ASA Status:  2      Anesthesia Type: General.     - Airway: ETT   Induction: Intravenous.   Maintenance: Balanced.        Consents    Anesthesia Plan(s) and associated risks, benefits, and realistic alternatives discussed. Questions answered and patient/representative(s) expressed understanding.    - Discussed:     - Discussed with:  Patient         Postoperative Care       PONV prophylaxis: Ondansetron (or other 5HT-3), Dexamethasone or Solumedrol, Background Propofol Infusion     Comments:                Minna Boo

## 2022-10-31 NOTE — ANESTHESIA PROCEDURE NOTES
Airway       Patient location during procedure: OR       Procedure Start/Stop Times: 10/31/2022 7:27 AM  Staff -        Anesthesiologist:  Minna Boo       CRNA: Valerie Walton APRN CRNA       Performed By: CRNA  Consent for Airway        Urgency: elective  Indications and Patient Condition       Indications for airway management: maynor-procedural       Induction type:intravenous       Mask difficulty assessment: 2 - vent by mask + OA or adjuvant +/- NMBA    Final Airway Details       Final airway type: endotracheal airway       Successful airway: ETT - single  Endotracheal Airway Details        ETT size (mm): 7.0       Cuffed: yes       Successful intubation technique: direct laryngoscopy       DL Blade Type: Phan 2       Grade View of Cords: 1       Adjucts: stylet       Position: Right       Measured from: lips       Secured at (cm): 21       Bite block used: None    Post intubation assessment        Placement verified by: capnometry, equal breath sounds and chest rise        Number of attempts at approach: 1       Secured with: pink tape       Ease of procedure: easy       Dentition: Intact and Unchanged    Medication(s) Administered   Medication Administration Time: 10/31/2022 7:27 AM

## 2022-10-31 NOTE — DISCHARGE INSTRUCTIONS
Today you were given 650 mg of Tylenol at 1230 PM The recommended daily maximum dose is 4000 mg.      Today you received Toradol, an antiinflammatory medication similar to Ibuprofen.  You should not take other antiinflammatory medication, such as Ibuprofen, Motrin, Advil, Aleve, Naprosyn, etc until 5:15 pm.      Same Day Surgery Discharge Instructions for  Sedation and General Anesthesia     It's not unusual to feel dizzy, light-headed or faint for up to 24 hours after surgery or while taking pain medication.  If you have these symptoms: sit for a few minutes before standing and have someone assist you when you get up to walk or use the bathroom.    You should rest and relax for the next 24 hours. We recommend you make arrangements to have an adult stay with you for at least 24 hours after your discharge.  Avoid hazardous and strenuous activity.    DO NOT DRIVE any vehicle or operate mechanical equipment for 24 hours following the end of your surgery.  Even though you may feel normal, your reactions may be affected by the medication you have received.    Do not drink alcoholic beverages for 24 hours following surgery.     Slowly progress to your regular diet as you feel able. It's not unusual to feel nauseated and/or vomit after receiving anesthesia.  If you develop these symptoms, drink clear liquids (apple juice, ginger ale, broth, 7-up, etc. ) until you feel better.  If your nausea and vomiting persists for 24 hours, please notify your surgeon.      All narcotic pain medications, along with inactivity and anesthesia, can cause constipation. Drinking plenty of liquids and increasing fiber intake will help.    For any questions of a medical nature, call your surgeon.    Do not make important decisions for 24 hours.    If you had general anesthesia, you may have a sore throat for a couple of days related to the breathing tube used during surgery.  You may use Cepacol lozenges to help with this discomfort.  If it  worsens or if you develop a fever, contact your surgeon.     If you feel your pain is not well managed with the pain medications prescribed by your surgeon, please contact your surgeon's office to let them know so they can address your concerns.      **If you have questions or concerns about your procedure, call   Dr. Graves at [phone].**

## 2022-10-31 NOTE — PROVIDER NOTIFICATION
MD Graves paged a second time via paging service to notify of reddened unblanchable skin to the right of the suture line. Patient is otherwise resolving nausea, and able to rest in recliner despite vomiting oxycodone dose. Awaiting response from MD.

## 2022-10-31 NOTE — PROVIDER NOTIFICATION
"MD Valentine notified via paging service that patient has reddened unblanchable skin to the right of the suture line and complaining of pain \"on the side\". She also has been in Phase II for 4 hours attempting to void (has had small success) and had emesis 3 times since surgery despite antinausea medications. At this time she is not tolerating Oxycodone and will plan to go home, have a meal she can tolerate and try later this evening. Will remain on APAP.  "

## 2022-11-01 LAB
PATH REPORT.COMMENTS IMP SPEC: NORMAL
PATH REPORT.COMMENTS IMP SPEC: NORMAL
PATH REPORT.FINAL DX SPEC: NORMAL
PATH REPORT.GROSS SPEC: NORMAL
PATH REPORT.MICROSCOPIC SPEC OTHER STN: NORMAL
PATH REPORT.RELEVANT HX SPEC: NORMAL
PHOTO IMAGE: NORMAL

## 2022-11-01 NOTE — OR NURSING
Message left for Dr Graves and Joelle PETERSON at 2000 regarding right breast.  Area noted to be soft.  Awaiting response.

## 2022-11-01 NOTE — OR NURSING
Spoke with Dr Graves. Updated her on patient regarding area on right breast that is reddened. Also stated pt stated the right side is more painful. Reddened area is soft to the touch, but tender. Asked about if it is ok for patient to take ibuprofen for pain. MD stated that would be ok.

## 2022-11-01 NOTE — PROGRESS NOTES
Willow COWART received handoff report as staff are awaiting call back from Dr. Graves before we can discharge.

## 2022-11-01 NOTE — OR NURSING
Pt discharged to home with  after speaking to Dr Graves.  Verbalized understanding of insructions and when to call doctor.

## 2023-02-27 ENCOUNTER — NURSE TRIAGE (OUTPATIENT)
Dept: FAMILY MEDICINE | Facility: CLINIC | Age: 58
End: 2023-02-27
Payer: COMMERCIAL

## 2023-02-27 NOTE — TELEPHONE ENCOUNTER
"1. COVID-19 DIAGNOSIS: \"Who made your COVID-19 diagnosis? Was it confirmed by a positive lab test?\"       Positive home test today. No lab test.   2. COVID-19 EXPOSURE: \"Was there any known exposure to COVID-19 before the symptoms began?\" Household exposure or close contact with positive COVID-19 patient outside the home (, school, work, play or sports).  CDC Definition of close contact: within 6 feet (2 meters) for a total of 15 minutes or more over a 24-hour period.       2/24/23   3. ONSET: \"When did the COVID-19 symptoms start?\"       2/26/23.   4. WORST SYMPTOM: \"What is your child's worst symptom?\"       Tightness in upper chest. Nasal congested.    5. COUGH: \"Does your child have a cough?\" If so, ask, \"How bad is the cough?\"        Tickle in the throat when talking.   6. RESPIRATORY DISTRESS: \"Describe your child's breathing. What does it sound like?\" (e.g., wheezing, stridor, grunting, weak cry, unable to speak, retractions, rapid rate, cyanosis)      No.   7. BETTER-SAME-WORSE: \"Is your child getting better, staying the same or getting worse compared to yesterday?\"  If getting worse, ask, \"In what way?\"      Worse today.   8. FEVER: \"Does your child have a fever?\" If so, ask: \"What is it, how was it measured, and how long has it been present?\"       Feels feverish and has chills. Fever started in the last couple of hours.   9. OTHER SYMPTOMS: \"Does your child have any other symptoms?\" (e.g., chills or shaking, sore throat, muscle pains, headache, loss of smell)       Low appetite, fatigue.   10. Difficulty sleeping last night.    11. HIGHER RISK for COMPLICATIONS with FLU or COVID-19 : \"Does your child have any chronic medical problems?\" (e.g., heart or lung disease, diabetes, asthma, cancer, weak immune system, etc. See that List in Background Information.  Reason: may need antiviral if has positive test for influenza.)         High BP. Overweight.   12. VACCINES:  \"Is your child vaccinated " "against COVID-19?\" If so,\"What vaccine (Pfizer, Moderna, Fabian and Fabian) did they receive?\" \"Have they received a booster shot?\"  Fully Vaccinated definition ([facility]):   Person has completed primary vaccine series and received a booster shot OR has completed primary vaccine series within the last 5 months and not yet eligible for booster shot.   Other people are either unvaccinated or partially vaccinated.         Vaccinated and boosted.           Reason for Disposition    [1] HIGH RISK for severe COVID complications (e.g., weak immune system, age > 64 years, obesity with BMI of 30 or higher, pregnant, chronic lung disease or other chronic medical condition) AND [2] COVID symptoms (e.g., cough, fever)  (Exceptions: Already seen by PCP and no new or worsening symptoms.)    Additional Information    Negative: [1] Difficulty breathing confirmed by triager BUT [2] not severe (Triage tip: Listen to the child's breathing.)    Negative: Ribs are pulling in with each breath (retractions)    Negative: [1] Age < 12 weeks AND [2] fever 100.4 F (38.0 C) or higher rectally    Negative: SEVERE chest pain or pressure (excruciating)    Negative: [1] Oxygen level <92% (<90% if altitude > 5000 feet) AND [2] any trouble breathing    Negative: [1] Stridor (harsh sound with breathing in) AND [2] doesn't respond to 20 minutes of warm mist OR has occurred 2 or more times    Negative: Rapid breathing (Breaths/min > 60 if < 2 mo; > 50 if 2-12 mo; > 40 if 1-5 years; > 30 if 6-11 years; > 20 if > 12 years)    Negative: [1] MODERATE chest pain or pressure (by caller's report) AND [2] can't take a deep breath    Negative: [1] Fever AND [2] > 105 F (40.6 C) by any route OR axillary > 104 F (40 C)    Negative: [1] Shaking chills (shivering) AND [2] present constantly > 30 minutes    Negative: [1] Sore throat AND [2] complication suspected (refuses to drink, can't swallow fluids, new-onset drooling, can't move neck normally or other serious " symptom)    Negative: [1] Muscle or body pains AND [2] complication suspected (can't stand, can't walk, can barely walk, can't move arm or hand normally or other serious symptom)    Negative: [1] Headache AND [2] complication suspected (stiff neck, incapacitated by pain, worst headache ever, confused, weakness or other serious symptom)    Negative: [1] Dehydration suspected AND [2] age < 1 year (signs: no urine > 8 hours AND very dry mouth, no  tears, ill-appearing, etc.)    Negative: [1] Dehydration suspected AND [2] age > 1 year (signs: no urine > 12 hours AND very dry mouth, no tears, ill-appearing, etc.)    Negative: Child sounds very sick or weak to the triager    Negative: [1] Wheezing confirmed by triager AND [2] no trouble breathing (Exception: known asthmatic)    Negative: [1] Lips or face have turned bluish BUT [2] only during coughing fits    Negative: [1] Age < 3 months AND [2] lots of coughing    Negative: [1] Crying continuously AND [2] cannot be comforted AND [3] present > 2 hours    Negative: SEVERE or constant chest pain or pressure  (Exception: Mild central chest pain, present only when coughing.)    Negative: MODERATE difficulty breathing (e.g., speaks in phrases, SOB even at rest, pulse 100-120)    Negative: Headache and stiff neck (can't touch chin to chest)    Negative: Oxygen level (e.g., pulse oximetry) 90 percent or lower    Negative: Chest pain or pressure  (Exception: MILD central chest pain, present only when coughing)    Negative: Patient sounds very sick or weak to the triager    Negative: MILD difficulty breathing (e.g., minimal/no SOB at rest, SOB with walking, pulse <100)    Negative: Fever > 103 F (39.4 C)    Negative: [1] Fever > 101 F (38.3 C) AND [2] over 60 years of age    Negative: [1] Fever > 100.0 F (37.8 C) AND [2] bedridden (e.g., nursing home patient, CVA, chronic illness, recovering from surgery)    Protocols used: CORONAVIRUS (COVID-19) DIAGNOSED OR [id number],  CORONAVIRUS (COVID-19) DIAGNOSED OR [id number]

## 2023-02-27 NOTE — TELEPHONE ENCOUNTER
RN COVID TREATMENT VISIT  02/27/23      The patient has been triaged and does not require a higher level of care.    Nasrin Zamora  57 year old  Current weight? 157     Has the patient been seen by a primary care provider at an [facility] or [facility] within the past two years? Yes.   Have you been in close proximity to/do you have a known exposure to a person with a confirmed case of influenza? No.     General treatment eligibility:  Date of positive COVID test (PCR or at home)? 2/27/23    Are you or have you been hospitalized for this COVID-19 infection? No.   Have you received monoclonal antibodies or antiviral treatment for COVID-19 since this positive test? No.   Do you have any of the following conditions that place you at risk of being very sick from COVID-19?   Yes, patient has at least one high risk condition as noted above.     Current COVID symptoms:   - fever or chills  - cough  - fatigue  - headache  - congestion or runny nose  Yes. Patient has at least one symptom as selected.     How many days since symptoms started? 5 days or less. Established patient, 12 years or older weighing at least 88.2 lbs, who has symptoms that started in the past 5 days, has not been hospitalized nor received treatment already, and is at risk for being very sick from COVID-19.     Treatment eligibility by RN:    Are you currently pregnant or nursing? No    Do you have a clinically significant hypersensitivity to nirmatrelvir or ritonavir, or toxic epidermal necrolysis (TEN) or Sanabrai-Fabian Syndrome? No    Do you have a history of hepatitis, any hepatic impairment on the Problem List (such as Child-Byrd Class C, cirrhosis, fatty liver disease, alcoholic liver disease), or was the last liver lab (hepatic panel, ALT, AST, ALK Phos, bilirubin) elevated in the past 6 months? No    Do you have any history of severe renal impairment (eGFR < 30mL/min)? No    Is patient eligible to continue?   Yes,  patient meets all eligibility requirements for the RN COVID treatment (as denoted by all no responses above).     Current Outpatient Medications   Medication Sig Dispense Refill     amLODIPine (NORVASC) 5 MG tablet Take 1 tablet (5 mg) by mouth daily 90 tablet 3     Ascorbic Acid (VITAMIN C PO)        CALCIUM PO        Cholecalciferol (VITAMIN D3 PO) Take by mouth daily       estradiol (ESTRACE) 1 MG tablet Take 1 mg by mouth daily       Omega-3 Fatty Acids (FISH OIL PO)        oxyCODONE (ROXICODONE) 5 MG tablet Take 1-2 tablets (5-10 mg) by mouth every 4 hours as needed for moderate to severe pain 20 tablet 0     progesterone (PROMETRIUM) 200 MG capsule TAKE 1 CAPSULE BY MOUTH ON DAYS 1-12 EACH MONTH       senna-docusate (SENOKOT-S/PERICOLACE) 8.6-50 MG tablet Take 1-2 tablets by mouth 2 times daily 20 tablet 0       Medications from List 1 of the standing order (on medications that exclude the use of Paxlovid) that patient is taking: NONE. Is patient taking Rian's Wort? No  Is patient taking [place]'s Wort or any meds from List 1? No.   Medications from List 2 of the standing order (on meds that provider needs to adjust) that patient is taking: amlodipine (Norvasc), explained a provider visit is necessary to discuss medication adjustments while taking Paxlovid. Is patient on any of the meds from List 2? Yes. Patient will be scheduled or transferred to a  at the end of this call.   Kassy Pierre RN     Patient is currently in [place] and will be back in [place] on 3/2/23. Although Thursday is still within the 5 days, she did not want to wait to get treatment then. Patient advised to get an appt or go to  in [place] and patient wanted Dr. Goldberg to call her. Triage nurse said that a message will be sent to Dr. Goldberg.

## 2023-02-28 NOTE — TELEPHONE ENCOUNTER
RN unable to write order for Paxlovid because: patient is taking amlodipine (Norvasc) and provider visit is necessary to discuss medication adjustments.     We, also may not prescribe for patient's out of state. Patient is refusing to be seen by a local provider.

## 2023-02-28 NOTE — TELEPHONE ENCOUNTER
Please call patient, does she not qualify for paxlovid based on norvasc?  Is so ok to give and change norvasc to 1/2 daily while on prescription and for 3 days after.    Lencho Goldberg M.D.

## 2023-02-28 NOTE — TELEPHONE ENCOUNTER
ROBERTO CARLOSI PCP:     Patient already spoke with a family friend physician and got the COVID-19 treatment so no further action is needed     She wanted FYI sent to PCP to let PCP know she is upset with the healthcare system in general. Doesn't understand why she can't do a virtual visit from out of state or just talk to a provider without a visit or get the medication without having been seen by [place] provider        Jes MATA, Triage RN  [facility]

## 2023-02-28 NOTE — TELEPHONE ENCOUNTER
If I am able to do a virtual visit out of state I am happy to do it at noon today.      Lencho Goldberg M.D.

## 2023-03-15 ENCOUNTER — TELEPHONE (OUTPATIENT)
Dept: FAMILY MEDICINE | Facility: CLINIC | Age: 58
End: 2023-03-15
Payer: COMMERCIAL

## 2023-03-17 NOTE — TELEPHONE ENCOUNTER
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 [facility]     Ok to call patient  ok to     Ana Colin/Mateo-  [facility]      
  Patient Contact    Attempt # 1    Was call answered? No.    Left message for patient to call triage back.    Yesy Whitlock RN    
Called patient and set up Rabies Vaccinations Appointments.     Appointments in Next Year    Mar 23, 2023 10:00 AM  MA Visit with CS MA/LPN  [facility] ([facility] ) [phone]   Mar 30, 2023 10:00 AM  MA Visit with CS MA/LPN  [facility] ([facility] ) [phone]        Yesy Maria RN on 3/17/2023 at 10:02 AM    
negative

## 2023-03-23 ENCOUNTER — ALLIED HEALTH/NURSE VISIT (OUTPATIENT)
Dept: FAMILY MEDICINE | Facility: CLINIC | Age: 58
End: 2023-03-23
Payer: COMMERCIAL

## 2023-03-23 DIAGNOSIS — Z23 NEED FOR VACCINATION: Primary | ICD-10-CM

## 2023-03-23 PROCEDURE — 90675 RABIES VACCINE IM: CPT

## 2023-03-23 PROCEDURE — 90471 IMMUNIZATION ADMIN: CPT

## 2023-03-23 PROCEDURE — 99207 PR NO CHARGE NURSE ONLY: CPT

## 2023-03-23 NOTE — PROGRESS NOTES
Prior to immunization administration, verified patients identity using patient s name and date of birth. Please see Immunization Activity for additional information.     Screening Questionnaire for Adult Immunization    Are you sick today?   No   Do you have allergies to medications, food, a vaccine component or latex?   No   Have you ever had a serious reaction after receiving a vaccination?   No   Do you have a long-term health problem with heart, lung, kidney, or metabolic disease (e.g., diabetes), asthma, a blood disorder, no spleen, complement component deficiency, a cochlear implant, or a spinal fluid leak?  Are you on long-term aspirin therapy?   No   Do you have cancer, leukemia, HIV/AIDS, or any other immune system problem?   No   Do you have a parent, brother, or sister with an immune system problem?   No   In the past 3 months, have you taken medications that affect  your immune system, such as prednisone, other steroids, or anticancer drugs; drugs for the treatment of rheumatoid arthritis, Crohn s disease, or psoriasis; or have you had radiation treatments?   No   Have you had a seizure, or a brain or other nervous system problem?   No   During the past year, have you received a transfusion of blood or blood    products, or been given immune (gamma) globulin or antiviral drug?   No   For women: Are you pregnant or is there a chance you could become       pregnant during the next month?   No   Have you received any vaccinations in the past 4 weeks?   Yes     Immunization questionnaire was positive for at least one answer.  Notified Dr. Goldberg. Vaccine was shingles vaccine.    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 Patient instructed to remain in clinic for 15 minutes afterwards, and to report any adverse reactions.     Screening performed by Jeyson Montague CMA  on 3/23/2023 at 10:11 AM.

## 2023-03-30 ENCOUNTER — TELEPHONE (OUTPATIENT)
Dept: FAMILY MEDICINE | Facility: CLINIC | Age: 58
End: 2023-03-30

## 2023-03-30 ENCOUNTER — ALLIED HEALTH/NURSE VISIT (OUTPATIENT)
Dept: FAMILY MEDICINE | Facility: CLINIC | Age: 58
End: 2023-03-30
Payer: COMMERCIAL

## 2023-03-30 DIAGNOSIS — Z23 NEED FOR VACCINATION: Primary | ICD-10-CM

## 2023-03-30 PROCEDURE — 90471 IMMUNIZATION ADMIN: CPT

## 2023-03-30 PROCEDURE — 99207 PR NO CHARGE NURSE ONLY: CPT

## 2023-03-30 PROCEDURE — 90675 RABIES VACCINE IM: CPT

## 2023-03-30 NOTE — PROGRESS NOTES
Patient presents for second rabies vaccination. 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 Patient is scheduled for titer draw 2 weeks from now for making sure that patient has antibodies for rabies. Telephone encounter routed to PCP for order for rabies titer.    Jeyson Montague CMA on 3/30/2023 at 10:10 AM

## 2023-03-30 NOTE — TELEPHONE ENCOUNTER
Patient is needing order for rabies titer to verify vaccination response. 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 Lab order pended for signature.    Jeyson Montague CMA on 3/30/2023 at 10:13 AM

## 2023-04-14 ENCOUNTER — LAB (OUTPATIENT)
Dept: LAB | Facility: CLINIC | Age: 58
End: 2023-04-14
Payer: COMMERCIAL

## 2023-04-14 DIAGNOSIS — Z11.59 NEED FOR HEPATITIS C SCREENING TEST: ICD-10-CM

## 2023-04-14 DIAGNOSIS — Z23 NEED FOR VACCINATION: ICD-10-CM

## 2023-04-14 DIAGNOSIS — Z11.4 SCREENING FOR HIV (HUMAN IMMUNODEFICIENCY VIRUS): ICD-10-CM

## 2023-04-14 PROCEDURE — 86382 NEUTRALIZATION TEST VIRAL: CPT | Mod: 90

## 2023-04-14 PROCEDURE — 86803 HEPATITIS C AB TEST: CPT

## 2023-04-14 PROCEDURE — 36415 COLL VENOUS BLD VENIPUNCTURE: CPT

## 2023-04-14 PROCEDURE — 99000 SPECIMEN HANDLING OFFICE-LAB: CPT

## 2023-04-14 PROCEDURE — 87389 HIV-1 AG W/HIV-1&-2 AB AG IA: CPT

## 2023-04-17 LAB
HCV AB SERPL QL IA: NONREACTIVE
HIV 1+2 AB+HIV1 P24 AG SERPL QL IA: NONREACTIVE

## 2023-04-18 NOTE — RESULT ENCOUNTER NOTE
Dear Nasrin,     Here are your recent screening Hepatitis c and HIV test results which are negative.     Please let us know if you have any questions or concerns.    Regards,  Heather Perales PA-C

## 2023-04-24 ENCOUNTER — TELEPHONE (OUTPATIENT)
Dept: FAMILY MEDICINE | Facility: CLINIC | Age: 58
End: 2023-04-24
Payer: COMMERCIAL

## 2023-04-24 NOTE — TELEPHONE ENCOUNTER
Pt called the clinic asking for the rabies antibody result. Informed pt that it is still in process. Pt is concerned about the length of time it is taking.     Informed pt this writer would call the lab to find out the status of it.     Per Rubia from lab- it gets shipped out and can take a month for results.     Called and left a detailed message on pts # with this info.

## 2023-04-29 LAB — RABV NAB SPEC NT-ACNC: NORMAL

## 2023-04-30 NOTE — RESULT ENCOUNTER NOTE
Dear Nasrin,     I'm covering for Dr. Goldberg today:    Here are your recent rabies antibody screen results which indicate an adequate immune response to the vaccine.    Please let us know if you have any questions or concerns.     Regards,  Heather Perales PA-C

## 2023-06-16 NOTE — TELEPHONE ENCOUNTER
Spoke to Nasrin and explained that this is not something that we normally do - and that both vaccines should be given at the same location. I advised they call [facility] Vaccine Scheduling line to ask if they can receive the second dose in a different state - as the current [facility] guidelines must be followed, so they would need to be approved for the vaccine and scheduled in a location that has Moderna available as well. They will call [phone].     Heather Orellana, CMA       Statement Selected

## 2023-07-25 ENCOUNTER — VIRTUAL VISIT (OUTPATIENT)
Dept: FAMILY MEDICINE | Facility: CLINIC | Age: 58
End: 2023-07-25
Payer: COMMERCIAL

## 2023-07-25 DIAGNOSIS — I10 PRIMARY HYPERTENSION: ICD-10-CM

## 2023-07-25 PROCEDURE — 99207 PR NO CHARGE LOS: CPT | Mod: VID | Performed by: INTERNAL MEDICINE

## 2023-07-25 RX ORDER — AMLODIPINE BESYLATE 5 MG/1
5 TABLET ORAL DAILY
Qty: 90 TABLET | Refills: 1 | Status: SHIPPED | OUTPATIENT
Start: 2023-07-25 | End: 2024-01-25

## 2023-07-25 NOTE — PROGRESS NOTES
Nasrin is a 58 year old who is being evaluated via a billable video visit.      How would you like to obtain your AVS? MyChart  If the video visit is dropped, the invitation should be resent by: Text to cell phone: [phone]  Will anyone else be joining your video visit? No              Subjective   Nasrin is a 58 year old, presenting for the following health issues:  No chief complaint on file.       No data to display              The patient was required to do a visit simply to renew her medication.  She plans on scheduling an upcoming annual wellness visit so I renewed her amlodipine.  Nothing else was done and no charge will be encountered today.  She we will follow-up in the next few months.    Vitals:  No vitals were obtained today due to virtual visit.    Physical Exam   GENERAL: Healthy, alert and no distress  EYES: Eyes grossly normal to inspection.  No discharge or erythema, or obvious scleral/conjunctival abnormalities.  RESP: No audible wheeze, cough, or visible cyanosis.  No visible retractions or increased work of breathing.    SKIN: Visible skin clear. No significant rash, abnormal pigmentation or lesions.  NEURO: Cranial nerves grossly intact.  Mentation and speech appropriate for age.  PSYCH: Mentation appears normal, affect normal/bright, judgement and insight intact, normal speech and appearance well-groomed.                Video-Visit Details    Type of service:  Video Visit     Originating Location (pt. Location): Home    Distant Location (provider location):  On-site  [facility] Visit[facility]

## 2023-09-14 ENCOUNTER — TRANSFERRED RECORDS (OUTPATIENT)
Dept: HEALTH INFORMATION MANAGEMENT | Facility: CLINIC | Age: 58
End: 2023-09-14
Payer: COMMERCIAL

## 2023-09-27 ENCOUNTER — APPOINTMENT (OUTPATIENT)
Dept: URBAN - METROPOLITAN AREA CLINIC 256 | Age: 58
Setting detail: DERMATOLOGY
End: 2023-09-27

## 2023-09-27 VITALS — WEIGHT: 145 LBS | HEIGHT: 63 IN

## 2023-09-27 DIAGNOSIS — D22 MELANOCYTIC NEVI: ICD-10-CM

## 2023-09-27 DIAGNOSIS — D18.0 HEMANGIOMA: ICD-10-CM

## 2023-09-27 DIAGNOSIS — L57.0 ACTINIC KERATOSIS: ICD-10-CM

## 2023-09-27 DIAGNOSIS — L82.1 OTHER SEBORRHEIC KERATOSIS: ICD-10-CM

## 2023-09-27 DIAGNOSIS — Z71.89 OTHER SPECIFIED COUNSELING: ICD-10-CM

## 2023-09-27 DIAGNOSIS — L57.8 OTHER SKIN CHANGES DUE TO CHRONIC EXPOSURE TO NONIONIZING RADIATION: ICD-10-CM

## 2023-09-27 PROBLEM — D22.61 MELANOCYTIC NEVI OF RIGHT UPPER LIMB, INCLUDING SHOULDER: Status: ACTIVE | Noted: 2023-09-27

## 2023-09-27 PROBLEM — D22.72 MELANOCYTIC NEVI OF LEFT LOWER LIMB, INCLUDING HIP: Status: ACTIVE | Noted: 2023-09-27

## 2023-09-27 PROBLEM — D22.5 MELANOCYTIC NEVI OF TRUNK: Status: ACTIVE | Noted: 2023-09-27

## 2023-09-27 PROBLEM — D22.71 MELANOCYTIC NEVI OF RIGHT LOWER LIMB, INCLUDING HIP: Status: ACTIVE | Noted: 2023-09-27

## 2023-09-27 PROBLEM — D22.62 MELANOCYTIC NEVI OF LEFT UPPER LIMB, INCLUDING SHOULDER: Status: ACTIVE | Noted: 2023-09-27

## 2023-09-27 PROBLEM — D18.01 HEMANGIOMA OF SKIN AND SUBCUTANEOUS TISSUE: Status: ACTIVE | Noted: 2023-09-27

## 2023-09-27 PROCEDURE — 99213 OFFICE O/P EST LOW 20 MIN: CPT | Mod: 25

## 2023-09-27 PROCEDURE — 17003 DESTRUCT PREMALG LES 2-14: CPT

## 2023-09-27 PROCEDURE — OTHER LIQUID NITROGEN: OTHER

## 2023-09-27 PROCEDURE — 17000 DESTRUCT PREMALG LESION: CPT

## 2023-09-27 PROCEDURE — OTHER COUNSELING: OTHER

## 2023-09-27 PROCEDURE — OTHER MIPS QUALITY: OTHER

## 2023-09-27 ASSESSMENT — LOCATION DETAILED DESCRIPTION DERM
LOCATION DETAILED: RIGHT NASAL SIDEWALL
LOCATION DETAILED: LEFT MEDIAL UPPER BACK
LOCATION DETAILED: RIGHT VENTRAL DISTAL FOREARM
LOCATION DETAILED: LEFT ANTERIOR DISTAL THIGH
LOCATION DETAILED: LEFT ANTECUBITAL SKIN
LOCATION DETAILED: RIGHT VENTRAL PROXIMAL FOREARM
LOCATION DETAILED: RIGHT ANTERIOR DISTAL THIGH
LOCATION DETAILED: LEFT INFERIOR CENTRAL MALAR CHEEK
LOCATION DETAILED: INFERIOR THORACIC SPINE
LOCATION DETAILED: SUPERIOR THORACIC SPINE
LOCATION DETAILED: LEFT VENTRAL DISTAL FOREARM
LOCATION DETAILED: LEFT INFERIOR FOREHEAD
LOCATION DETAILED: RIGHT LATERAL FOREHEAD
LOCATION DETAILED: LEFT VENTRAL PROXIMAL FOREARM
LOCATION DETAILED: LEFT INFERIOR LATERAL MIDBACK

## 2023-09-27 ASSESSMENT — LOCATION SIMPLE DESCRIPTION DERM
LOCATION SIMPLE: RIGHT FOREHEAD
LOCATION SIMPLE: LEFT FOREHEAD
LOCATION SIMPLE: LEFT LOWER BACK
LOCATION SIMPLE: RIGHT FOREARM
LOCATION SIMPLE: LEFT UPPER ARM
LOCATION SIMPLE: RIGHT THIGH
LOCATION SIMPLE: LEFT FOREARM
LOCATION SIMPLE: RIGHT NOSE
LOCATION SIMPLE: LEFT UPPER BACK
LOCATION SIMPLE: LEFT THIGH
LOCATION SIMPLE: LEFT CHEEK
LOCATION SIMPLE: UPPER BACK

## 2023-09-27 ASSESSMENT — LOCATION ZONE DERM
LOCATION ZONE: LEG
LOCATION ZONE: NOSE
LOCATION ZONE: ARM
LOCATION ZONE: TRUNK
LOCATION ZONE: FACE

## 2023-09-27 NOTE — HPI: FULL BODY SKIN EXAMINATION
What Is The Reason For Today's Visit?: Full Body Skin Examination
What Is The Reason For Today's Visit? (Being Monitored For X): concerning skin lesions on an annual basis
Additional History: Patient has never had a FBSE done before by a Dermatologist.

## 2023-09-27 NOTE — PROCEDURE: LIQUID NITROGEN
Number Of Freeze-Thaw Cycles: 1 freeze-thaw cycle
Detail Level: Detailed
Application Tool (Optional): Liquid Nitrogen Sprayer
Consent: The patient's consent was obtained including but not limited to risks of crusting, scabbing, blistering, scarring, darker or lighter pigmentary change, recurrence, incomplete removal and infection.
Render Note In Bullet Format When Appropriate: No
Show Aperture Variable?: Yes
Post-Care Instructions: I reviewed with the patient in detail post-care instructions. Patient is to wear sunprotection, and avoid picking at any of the treated lesions. Pt may apply Vaseline to crusted or scabbing areas.
Duration Of Freeze Thaw-Cycle (Seconds): 5

## 2023-11-01 ENCOUNTER — TRANSFERRED RECORDS (OUTPATIENT)
Dept: MULTI SPECIALTY CLINIC | Facility: CLINIC | Age: 58
End: 2023-11-01

## 2024-01-13 ENCOUNTER — HEALTH MAINTENANCE LETTER (OUTPATIENT)
Age: 59
End: 2024-01-13

## 2024-01-25 ENCOUNTER — OFFICE VISIT (OUTPATIENT)
Dept: FAMILY MEDICINE | Facility: CLINIC | Age: 59
End: 2024-01-25
Payer: COMMERCIAL

## 2024-01-25 VITALS
HEIGHT: 62 IN | DIASTOLIC BLOOD PRESSURE: 84 MMHG | BODY MASS INDEX: 28.35 KG/M2 | TEMPERATURE: 97.7 F | SYSTOLIC BLOOD PRESSURE: 155 MMHG | RESPIRATION RATE: 16 BRPM | HEART RATE: 97 BPM | OXYGEN SATURATION: 98 %

## 2024-01-25 DIAGNOSIS — Z00.00 ENCOUNTER FOR ANNUAL PHYSICAL EXAM: Primary | ICD-10-CM

## 2024-01-25 DIAGNOSIS — D12.6 TUBULAR ADENOMA OF COLON: ICD-10-CM

## 2024-01-25 DIAGNOSIS — I10 PRIMARY HYPERTENSION: ICD-10-CM

## 2024-01-25 LAB
ERYTHROCYTE [DISTWIDTH] IN BLOOD BY AUTOMATED COUNT: 12.5 % (ref 10–15)
HCT VFR BLD AUTO: 39.7 % (ref 35–47)
HGB BLD-MCNC: 13.3 G/DL (ref 11.7–15.7)
MCH RBC QN AUTO: 31.5 PG (ref 26.5–33)
MCHC RBC AUTO-ENTMCNC: 33.5 G/DL (ref 31.5–36.5)
MCV RBC AUTO: 94 FL (ref 78–100)
PLATELET # BLD AUTO: 235 10E3/UL (ref 150–450)
RBC # BLD AUTO: 4.22 10E6/UL (ref 3.8–5.2)
WBC # BLD AUTO: 6.4 10E3/UL (ref 4–11)

## 2024-01-25 PROCEDURE — 80053 COMPREHEN METABOLIC PANEL: CPT | Performed by: INTERNAL MEDICINE

## 2024-01-25 PROCEDURE — 99396 PREV VISIT EST AGE 40-64: CPT | Performed by: INTERNAL MEDICINE

## 2024-01-25 PROCEDURE — 85027 COMPLETE CBC AUTOMATED: CPT | Performed by: INTERNAL MEDICINE

## 2024-01-25 PROCEDURE — 36415 COLL VENOUS BLD VENIPUNCTURE: CPT | Performed by: INTERNAL MEDICINE

## 2024-01-25 PROCEDURE — 80061 LIPID PANEL: CPT | Performed by: INTERNAL MEDICINE

## 2024-01-25 RX ORDER — AMLODIPINE BESYLATE 5 MG/1
5 TABLET ORAL DAILY
Qty: 90 TABLET | Refills: 3 | Status: SHIPPED | OUTPATIENT
Start: 2024-01-25

## 2024-01-25 ASSESSMENT — ENCOUNTER SYMPTOMS
FEVER: 0
SHORTNESS OF BREATH: 0
WEAKNESS: 0
CONSTIPATION: 0
DIARRHEA: 0
FREQUENCY: 0
SORE THROAT: 0
MYALGIAS: 0
COUGH: 0
NERVOUS/ANXIOUS: 0
DYSURIA: 0
EYE PAIN: 0
HEARTBURN: 0
PARESTHESIAS: 0
JOINT SWELLING: 0
ARTHRALGIAS: 0
HEADACHES: 0
PALPITATIONS: 0
ABDOMINAL PAIN: 0
DIZZINESS: 0
NAUSEA: 0
HEMATURIA: 0
CHILLS: 0
BREAST MASS: 0
HEMATOCHEZIA: 0

## 2024-01-25 ASSESSMENT — PAIN SCALES - GENERAL: PAINLEVEL: NO PAIN (0)

## 2024-01-25 NOTE — PROGRESS NOTES
Preventive Care Visit  [facility] FERN Goldberg MD, Internal Medicine  Jan 25, 2024       SUBJECTIVE:   Nasrin is a 58 year old, presenting for the following:    She is doing quite well and works out regularly.  At home her blood pressures in the 130s.  She is up-to-date with gynecology and has had a mammogram.  She is up-to-date on colon exam.  She is  and has 3 kids.  I see her .      No chief complaint on file.      Healthy Habits:     Getting at least 3 servings of Calcium per day:  Yes    Bi-annual eye exam:  Yes    Dental care twice a year:  Yes    Sleep apnea or symptoms of sleep apnea:  None    Diet:  Regular (no restrictions)    Frequency of exercise:  6-7 days/week    Duration of exercise:  30-45 minutes    Taking medications regularly:  Yes    Medication side effects:  Not applicable    Additional concerns today:  No      Today's PHQ-2 Score:       1/25/2024     2:49 PM   PHQ-2 ( 1999 [employer])   Q1: Little interest or pleasure in doing things 0   Q2: Feeling down, depressed or hopeless 0   PHQ-2 Score 0   Q1: Little interest or pleasure in doing things Not at all   Q2: Feeling down, depressed or hopeless Not at all   PHQ-2 Score 0                Past Medical History:      Past Medical History:   Diagnosis Date    Chest pain 02/2019    nuclear est nl done at [facility]    Primary hypertension 07/2022    norvasc started then    Tubular adenoma of colon 02/2016    fu 5 years, fu done 2021 and one 3mm tubular adenoma, fu 5 years             Past Surgical History:      Past Surgical History:   Procedure Laterality Date    COLONOSCOPY N/A 2/22/2016    Procedure: COMBINED COLONOSCOPY, SINGLE OR MULTIPLE BIOPSY/POLYPECTOMY BY BIOPSY;  Surgeon: Carie Barnes MD;  Location:  GI    COLONOSCOPY N/A 10/27/2021    Procedure: COLONOSCOPY, FLEXIBLE, WITH LESION REMOVAL USING SNARE;  Surgeon: Carie Barnes MD;  Location:  GI    KNEE SURGERY [employer]    MAMMOPLASTY  REDUCTION BILATERAL Bilateral 10/31/2022    Procedure: BILATERAL BREAST REDUCTION;  Surgeon: Staci Graves MD;  Location:  OR             Social History:     Social History     Socioeconomic History    Marital status:      Spouse name: Not on file    Number of children: 3    Years of education: Not on file    Highest education level: Not on file   Occupational History    Occupation: volunteer work   Tobacco Use    Smoking status: Never    Smokeless tobacco: Never   Vaping Use    Vaping Use: Never used   Substance and Sexual Activity    Alcohol use: Yes     Alcohol/week: 4.2 standard drinks of alcohol     Types: 5 Standard drinks or equivalent per week     Comment: 5 drinks a week     Drug use: No    Sexual activity: Yes     Partners: Male   Other Topics Concern    Parent/sibling w/ CABG, MI or angioplasty before 65F 55M? Not Asked   Social History Narrative    Not on file     Social Determinants of Health     Financial Resource Strain: Low Risk  (1/25/2024)    Financial Resource Strain     Within the past 12 months, have you or your family members you live with been unable to get utilities (heat, electricity) when it was really needed?: No   Food Insecurity: Low Risk  (1/25/2024)    Food Insecurity     Within the past 12 months, did you worry that your food would run out before you got money to buy more?: No     Within the past 12 months, did the food you bought just not last and you didn t have money to get more?: No   Transportation Needs: Low Risk  (1/25/2024)    Transportation Needs     Within the past 12 months, has lack of transportation kept you from medical appointments, getting your medicines, non-medical meetings or appointments, work, or from getting things that you need?: No   Physical Activity: Not on file   Stress: Not on file   Social Connections: Not on file   Interpersonal Safety: Not on file   Housing Stability: Low Risk  (1/25/2024)    Housing Stability     Do you have housing? : Yes  "    Are you worried about losing your housing?: No             Family History:   reviewed         Allergies:   No Known Allergies          Medications:     Current Outpatient Medications   Medication Sig Dispense Refill    amLODIPine (NORVASC) 5 MG tablet Take 1 tablet (5 mg) by mouth daily 90 tablet 3    Ascorbic Acid (VITAMIN C PO)       CALCIUM PO       Cholecalciferol (VITAMIN D3 PO) Take by mouth daily      estradiol (ESTRACE) 1 MG tablet Take 1 mg by mouth daily      Omega-3 Fatty Acids (FISH OIL PO)       progesterone (PROMETRIUM) 200 MG capsule TAKE 1 CAPSULE BY MOUTH ON DAYS 1-12 EACH MONTH                 Review of Systems:     The 10 point Review of Systems is negative other than noted in the HPI           Physical Exam:   Blood pressure (!) 155/84, pulse 97, temperature 97.7  F (36.5  C), temperature source Temporal, resp. rate 16, height 1.575 m (5' 2\"), SpO2 98%.    Exam:  Constitutional: healthy appearing, alert and in no distress  Heent: Normocephalic. Head without obvious masses or lesions. PERRLDC, EOMI. Mouth exam within normal limits: tongue, mucous membranes, posterior pharynx all normal, no lesions or abnormalities seen.  Tm's and canals within normal limits bilaterally. Neck supple, no nuchal rigidity or masses. No supraclavicular, or cervical adenopathy. Thyroid symmetric, no masses.  Cardiovascular: Regular rate and rhythm, no murmer, rub or gallops.  JVP not elevated, no edema.  Carotids within normal limits bilaterally, no bruits.  Respiratory: Normal respiratory effort.  Lungs clear, normal flow, no wheezing or crackles.  Gastrointestinal: Normal active bowel sounds.   Soft, not tender, no masses, guarding or rebound.  No hepatosplenomegaly.   Musculoskeletal: extremities normal, no gross deformities noted.  Skin: no suspicious lesions or rashes   Neurologic: Mental status within normal limits.  Speech fluent.  No gross motor abnormalities and gait intact.  Psychiatric: mentation appears " normal and affect normal.         Data:   Labs sent        Assessment:   Normal complete physical exam  Hypertension, controlled at home, she will monitor it and call if up  Colon polyp, follow up as noted  hcm         Plan:   Up to date immunizations, colon, pap and mammogram  Exercise, diet  Monitor blood pressure and if up call  Letter with labs      Lencho Goldberg M.D.

## 2024-01-26 ENCOUNTER — PATIENT OUTREACH (OUTPATIENT)
Dept: GASTROENTEROLOGY | Facility: CLINIC | Age: 59
End: 2024-01-26
Payer: COMMERCIAL

## 2024-01-26 LAB
ALBUMIN SERPL BCG-MCNC: 4.4 G/DL (ref 3.5–5.2)
ALP SERPL-CCNC: 58 U/L (ref 40–150)
ALT SERPL W P-5'-P-CCNC: 21 U/L (ref 0–50)
ANION GAP SERPL CALCULATED.3IONS-SCNC: 11 MMOL/L (ref 7–15)
AST SERPL W P-5'-P-CCNC: 24 U/L (ref 0–45)
BILIRUB SERPL-MCNC: 0.5 MG/DL
BUN SERPL-MCNC: 19.4 MG/DL (ref 6–20)
CALCIUM SERPL-MCNC: 9.4 MG/DL (ref 8.6–10)
CHLORIDE SERPL-SCNC: 104 MMOL/L (ref 98–107)
CHOLEST SERPL-MCNC: 266 MG/DL
CREAT SERPL-MCNC: 1.1 MG/DL (ref 0.51–0.95)
DEPRECATED HCO3 PLAS-SCNC: 23 MMOL/L (ref 22–29)
EGFRCR SERPLBLD CKD-EPI 2021: 58 ML/MIN/1.73M2
FASTING STATUS PATIENT QL REPORTED: NO
GLUCOSE SERPL-MCNC: 97 MG/DL (ref 70–99)
HDLC SERPL-MCNC: 131 MG/DL
LDLC SERPL CALC-MCNC: 98 MG/DL
NONHDLC SERPL-MCNC: 135 MG/DL
POTASSIUM SERPL-SCNC: 4.1 MMOL/L (ref 3.4–5.3)
PROT SERPL-MCNC: 6.2 G/DL (ref 6.4–8.3)
SODIUM SERPL-SCNC: 138 MMOL/L (ref 135–145)
TRIGL SERPL-MCNC: 184 MG/DL

## 2024-01-26 NOTE — RESULT ENCOUNTER NOTE
It was nice seeing you for your physical.  You should be able to view your test results.    Your CBC your blood count is normal with no signs of anemia or blood disorders.  Your chemistry panel shows no diabetes.  Your blood salts, liver tests, and proteins are all fine.  The creatinine is a kidney test.  Your creatinine level is just slightly higher this year which is probably a lab variance.  I am not worried but to be on the safe side I would like to repeat it in 3 months just to see where you are at.    Your total cholesterol is high at 266.  Your HDL or good cholesterol is fabulous so you really do not have any problems.    I am happy to bring you this overall excellent report.  Please be sure to follow-up and get a repeat nonfasting blood test in 3 months.  You can schedule this on [facility].    Lencho Goldberg M.D.

## 2024-03-11 ENCOUNTER — TELEPHONE (OUTPATIENT)
Dept: FAMILY MEDICINE | Facility: CLINIC | Age: 59
End: 2024-03-11
Payer: COMMERCIAL

## 2024-03-13 ENCOUNTER — TELEPHONE (OUTPATIENT)
Dept: FAMILY MEDICINE | Facility: CLINIC | Age: 59
End: 2024-03-13
Payer: COMMERCIAL

## 2024-03-13 NOTE — TELEPHONE ENCOUNTER
Reason for Call:  Other prescription    Detailed comments: Patient would like an Rx for an anti nausea medication. She is leaving the country tomorrow for [place]. Please call    Phone Number Patient can be reached at: Cell number on file:    Telephone Information:   Mobile [phone]       Best Time: before 1 pm or after 2 pm today    Can we leave a detailed message on this number? YES    Call taken on 3/13/2024 at 10:17 AM by Marybel Rios

## 2024-03-15 NOTE — TELEPHONE ENCOUNTER
Patient Contact    Attempt # 1    Was call answered? No.    Left message for patient to call triage back.    Yesy Whitlock RN

## 2024-03-15 NOTE — TELEPHONE ENCOUNTER
Note below states the patient was leaving for [place] yesterday (3/14/24).     Will route to triage to please try to contact the patient. Is she still needing a script for anti-nausea medications?    Thank you,  Beatrice Childs RN

## 2024-06-17 ENCOUNTER — LAB (OUTPATIENT)
Dept: LAB | Facility: CLINIC | Age: 59
End: 2024-06-17
Payer: COMMERCIAL

## 2024-06-17 DIAGNOSIS — R79.89 ELEVATED SERUM CREATININE: ICD-10-CM

## 2024-06-17 LAB
ALBUMIN UR-MCNC: NEGATIVE MG/DL
APPEARANCE UR: CLEAR
BILIRUB UR QL STRIP: NEGATIVE
COLOR UR AUTO: YELLOW
CREAT SERPL-MCNC: 0.73 MG/DL (ref 0.51–0.95)
EGFRCR SERPLBLD CKD-EPI 2021: >90 ML/MIN/1.73M2
GLUCOSE UR STRIP-MCNC: NEGATIVE MG/DL
HGB UR QL STRIP: NEGATIVE
KETONES UR STRIP-MCNC: NEGATIVE MG/DL
LEUKOCYTE ESTERASE UR QL STRIP: NEGATIVE
NITRATE UR QL: NEGATIVE
PH UR STRIP: 6 [PH] (ref 5–7)
SP GR UR STRIP: 1.01 (ref 1–1.03)
UROBILINOGEN UR STRIP-ACNC: 0.2 E.U./DL

## 2024-06-17 PROCEDURE — 36415 COLL VENOUS BLD VENIPUNCTURE: CPT

## 2024-06-17 PROCEDURE — 81003 URINALYSIS AUTO W/O SCOPE: CPT

## 2024-06-17 PROCEDURE — 82565 ASSAY OF CREATININE: CPT

## 2024-06-18 NOTE — RESULT ENCOUNTER NOTE
Good news, everything is back to normal.  Your creatinine is normal and your urine is normal as well.    Lencho Goldberg M.D.

## 2025-01-30 ENCOUNTER — OFFICE VISIT (OUTPATIENT)
Dept: FAMILY MEDICINE | Facility: CLINIC | Age: 60
End: 2025-01-30
Payer: COMMERCIAL

## 2025-01-30 VITALS
DIASTOLIC BLOOD PRESSURE: 100 MMHG | HEART RATE: 86 BPM | WEIGHT: 151 LBS | RESPIRATION RATE: 16 BRPM | SYSTOLIC BLOOD PRESSURE: 164 MMHG | TEMPERATURE: 97.5 F | HEIGHT: 62 IN | OXYGEN SATURATION: 99 % | BODY MASS INDEX: 27.79 KG/M2

## 2025-01-30 DIAGNOSIS — I10 PRIMARY HYPERTENSION: ICD-10-CM

## 2025-01-30 DIAGNOSIS — Z00.00 ENCOUNTER FOR ANNUAL PHYSICAL EXAM: Primary | ICD-10-CM

## 2025-01-30 DIAGNOSIS — D12.6 TUBULAR ADENOMA OF COLON: ICD-10-CM

## 2025-01-30 LAB
ALBUMIN SERPL BCG-MCNC: 4.3 G/DL (ref 3.5–5.2)
ALP SERPL-CCNC: 57 U/L (ref 40–150)
ALT SERPL W P-5'-P-CCNC: 21 U/L (ref 0–50)
ANION GAP SERPL CALCULATED.3IONS-SCNC: 11 MMOL/L (ref 7–15)
AST SERPL W P-5'-P-CCNC: 25 U/L (ref 0–45)
BILIRUB SERPL-MCNC: 0.8 MG/DL
BUN SERPL-MCNC: 11 MG/DL (ref 8–23)
CALCIUM SERPL-MCNC: 10.1 MG/DL (ref 8.8–10.4)
CHLORIDE SERPL-SCNC: 97 MMOL/L (ref 98–107)
CHOLEST SERPL-MCNC: 268 MG/DL
CREAT SERPL-MCNC: 0.8 MG/DL (ref 0.51–0.95)
EGFRCR SERPLBLD CKD-EPI 2021: 84 ML/MIN/1.73M2
ERYTHROCYTE [DISTWIDTH] IN BLOOD BY AUTOMATED COUNT: 12.9 % (ref 10–15)
FASTING STATUS PATIENT QL REPORTED: YES
FASTING STATUS PATIENT QL REPORTED: YES
GLUCOSE SERPL-MCNC: 97 MG/DL (ref 70–99)
HCO3 SERPL-SCNC: 25 MMOL/L (ref 22–29)
HCT VFR BLD AUTO: 41 % (ref 35–47)
HDLC SERPL-MCNC: 135 MG/DL
HGB BLD-MCNC: 13.8 G/DL (ref 11.7–15.7)
LDLC SERPL CALC-MCNC: 117 MG/DL
MCH RBC QN AUTO: 30.8 PG (ref 26.5–33)
MCHC RBC AUTO-ENTMCNC: 33.7 G/DL (ref 31.5–36.5)
MCV RBC AUTO: 92 FL (ref 78–100)
NONHDLC SERPL-MCNC: 133 MG/DL
PLATELET # BLD AUTO: 276 10E3/UL (ref 150–450)
POTASSIUM SERPL-SCNC: 4.4 MMOL/L (ref 3.4–5.3)
PROT SERPL-MCNC: 6.5 G/DL (ref 6.4–8.3)
RBC # BLD AUTO: 4.48 10E6/UL (ref 3.8–5.2)
SODIUM SERPL-SCNC: 133 MMOL/L (ref 135–145)
TRIGL SERPL-MCNC: 79 MG/DL
WBC # BLD AUTO: 7.8 10E3/UL (ref 4–11)

## 2025-01-30 RX ORDER — AMLODIPINE BESYLATE 5 MG/1
5 TABLET ORAL DAILY
Qty: 90 TABLET | Refills: 3 | Status: SHIPPED | OUTPATIENT
Start: 2025-01-30

## 2025-01-30 RX ORDER — HYDROCHLOROTHIAZIDE 12.5 MG/1
12.5 TABLET ORAL DAILY
Qty: 90 TABLET | Refills: 3 | Status: SHIPPED | OUTPATIENT
Start: 2025-01-30

## 2025-01-30 SDOH — HEALTH STABILITY: PHYSICAL HEALTH: ON AVERAGE, HOW MANY DAYS PER WEEK DO YOU ENGAGE IN MODERATE TO STRENUOUS EXERCISE (LIKE A BRISK WALK)?: 7 DAYS

## 2025-01-30 SDOH — HEALTH STABILITY: PHYSICAL HEALTH: ON AVERAGE, HOW MANY MINUTES DO YOU ENGAGE IN EXERCISE AT THIS LEVEL?: 60 MIN

## 2025-01-30 ASSESSMENT — SOCIAL DETERMINANTS OF HEALTH (SDOH): HOW OFTEN DO YOU GET TOGETHER WITH FRIENDS OR RELATIVES?: THREE TIMES A WEEK

## 2025-01-30 NOTE — PATIENT INSTRUCTIONS
Start the hydrochlorothiazide in addition to the amlodipine to lower your blood pressure.  In 2 weeks start checking your blood pressure after sitting for 5 minutes about 3x a week.  2 weeks after that send me the readings.  If you have side effects stop it and let me know.    Lencho Goldberg M.D.

## 2025-01-30 NOTE — PROGRESS NOTES
Preventive Care Visit  [facility] FERN Goldberg MD, Internal Medicine  Jan 30, 2025          Alejandra Alexandra is a 59 year old, presenting for the following:    She is doing well and does workout but her blood pressure at home can be on average 140.  She takes amlodipine.  She does not smoke or drink excessively.  She feels fine.  She is up-to-date with her gynecologist.  She is up-to-date on her mammogram.  She is up-to-date on colon exam.    She has  and has 3 kids, youngest is 29.  She now has 2 grandkids 1 born in September and 1 born more recently with some health issues.               Past Medical History:      Past Medical History:   Diagnosis Date    Chest pain 02/2019    nuclear est nl done at [facility]    Primary hypertension 07/2022    norvasc started then    Tubular adenoma of colon 02/2016    fu 5 years, fu done 2021 and one 3mm tubular adenoma, fu 5 years             Past Surgical History:      Past Surgical History:   Procedure Laterality Date    COLONOSCOPY N/A 2/22/2016    Procedure: COMBINED COLONOSCOPY, SINGLE OR MULTIPLE BIOPSY/POLYPECTOMY BY BIOPSY;  Surgeon: Carie Barnes MD;  Location:  GI    COLONOSCOPY N/A 10/27/2021    Procedure: COLONOSCOPY, FLEXIBLE, WITH LESION REMOVAL USING SNARE;  Surgeon: Carie Barnes MD;  Location:  GI    KNEE SURGERY [employer]    MAMMOPLASTY REDUCTION BILATERAL Bilateral 10/31/2022    Procedure: BILATERAL BREAST REDUCTION;  Surgeon: Staci Graves MD;  Location:  OR             Social History:     Social History     Socioeconomic History    Marital status:      Spouse name: Not on file    Number of children: 3    Years of education: Not on file    Highest education level: Not on file   Occupational History    Occupation: volunteer work   Tobacco Use    Smoking status: Never    Smokeless tobacco: Never   Vaping Use    Vaping status: Never Used   Substance and Sexual Activity    Alcohol use: Yes      Alcohol/week: 4.2 standard drinks of alcohol     Types: 5 Standard drinks or equivalent per week     Comment: 5 drinks a week     Drug use: No    Sexual activity: Yes     Partners: Male   Other Topics Concern    Parent/sibling w/ CABG, MI or angioplasty before 65F 55M? Not Asked   Social History Narrative    Not on file     Social Drivers of Health     Financial Resource Strain: Low Risk  (1/30/2025)    Financial Resource Strain     Within the past 12 months, have you or your family members you live with been unable to get utilities (heat, electricity) when it was really needed?: No   Food Insecurity: Low Risk  (1/30/2025)    Food Insecurity     Within the past 12 months, did you worry that your food would run out before you got money to buy more?: No     Within the past 12 months, did the food you bought just not last and you didn t have money to get more?: No   Transportation Needs: Low Risk  (1/30/2025)    Transportation Needs     Within the past 12 months, has lack of transportation kept you from medical appointments, getting your medicines, non-medical meetings or appointments, work, or from getting things that you need?: No   Physical Activity: Sufficiently Active (1/30/2025)    Exercise Vital Sign     Days of Exercise per Week: 7 days     Minutes of Exercise per Session: 60 min   Stress: No Stress Concern Present (1/30/2025)    [place] [place] [employer] - Occupational Stress Questionnaire     Feeling of Stress : Only a little   Social Connections: Unknown (1/30/2025)    Social Connection and Isolation Panel [NHANES]     Frequency of Communication with Friends and Family: Not on file     Frequency of Social Gatherings with Friends and Family: Three times a week     Attends Congregation Services: Not on file     Active Member of Clubs or Organizations: Not on file     Attends Club or Organization Meetings: Not on file     Marital Status: Not on file   Interpersonal Safety: Low Risk  (1/30/2025)     "Interpersonal Safety     Do you feel physically and emotionally safe where you currently live?: Yes     Within the past 12 months, have you been hit, slapped, kicked or otherwise physically hurt by someone?: No     Within the past 12 months, have you been humiliated or emotionally abused in other ways by your partner or ex-partner?: No   Housing Stability: Low Risk  (1/30/2025)    Housing Stability     Do you have housing? : Yes     Are you worried about losing your housing?: No             Family History:   reviewed         Allergies:   No Known Allergies          Medications:     Current Outpatient Medications   Medication Sig Dispense Refill    amLODIPine (NORVASC) 5 MG tablet Take 1 tablet (5 mg) by mouth daily. 90 tablet 3    Ascorbic Acid (VITAMIN C PO)       CALCIUM PO       Cholecalciferol (VITAMIN D3 PO) Take by mouth daily      estradiol (ESTRACE) 1 MG tablet Take 1 mg by mouth daily      hydroCHLOROthiazide 12.5 MG tablet Take 1 tablet (12.5 mg) by mouth daily. 90 tablet 3    Omega-3 Fatty Acids (FISH OIL PO)       progesterone (PROMETRIUM) 200 MG capsule TAKE 1 CAPSULE BY MOUTH ON DAYS 1-12 EACH MONTH                 Review of Systems:     The 10 point Review of Systems is negative other than noted in the HPI           Physical Exam:   Blood pressure (!) 164/100, pulse 86, temperature 97.5  F (36.4  C), temperature source Temporal, resp. rate 16, height 1.575 m (5' 2\"), weight 68.5 kg (151 lb), SpO2 99%.    Exam:  Constitutional: healthy appearing, alert and in no distress  Heent: Normocephalic. Head without obvious masses or lesions. PERRLDC, EOMI. Mouth exam within normal limits: tongue, mucous membranes, posterior pharynx all normal, no lesions or abnormalities seen.  Tm's and canals within normal limits bilaterally. Neck supple, no nuchal rigidity or masses. No supraclavicular, or cervical adenopathy. Thyroid symmetric, no masses.  Cardiovascular: Regular rate and rhythm, no murmer, rub or gallops.  " JVP not elevated, no edema.  Carotids within normal limits bilaterally, no bruits.  Respiratory: Normal respiratory effort.  Lungs clear, normal flow, no wheezing or crackles.  Gastrointestinal: Normal active bowel sounds.   Soft, not tender, no masses, guarding or rebound.  No hepatosplenomegaly.   Musculoskeletal: extremities normal, no gross deformities noted.  Skin: no suspicious lesions or rashes   Neurologic: Mental status within normal limits.  Speech fluent.  No gross motor abnormalities and gait intact.  Psychiatric: mentation appears normal and affect normal.         Data:   Labs sent        Assessment:   Normal complete physical exam  Hypertension, control not adequate, doubt secondary cause.  Colon polyp, follow-up as noted  Healthcare maintenance         Plan:   She did not want a flu shot  Add hydrochlorothiazide 12.5 mg.  She will start checking her blood pressure in 2 weeks.  She will call if side effects.  She will check her blood pressure about 3 times a week and send me the readings about 2 weeks after starting the medication.  Letter with labs  Exercise and diet  Up-to-date Pap, mammogram, colon exam      Lencho Goldberg M.D.        The longitudinal plan of care for the diagnosis(es)/condition(s) as documented were addressed during this visit. Due to the added complexity in care, I will continue to support Nasrin in the subsequent management and with ongoing continuity of care.

## 2025-03-04 ENCOUNTER — LAB (OUTPATIENT)
Dept: LAB | Facility: CLINIC | Age: 60
End: 2025-03-04
Payer: COMMERCIAL

## 2025-03-04 DIAGNOSIS — E87.1 HYPONATREMIA: ICD-10-CM

## 2025-03-04 LAB — SODIUM SERPL-SCNC: 141 MMOL/L (ref 135–145)

## 2025-03-04 PROCEDURE — 84295 ASSAY OF SERUM SODIUM: CPT

## 2025-03-04 PROCEDURE — 36415 COLL VENOUS BLD VENIPUNCTURE: CPT

## 2025-03-05 NOTE — RESULT ENCOUNTER NOTE
Good news, your sodium level is normal.  Please let me know if you have questions.    Lencho Goldberg M.D.

## (undated) DEVICE — DRAIN JACKSON PRATT 15FR ROUND SIL LF JP-2229

## (undated) DEVICE — PREP CHLORAPREP 26ML TINTED HI-LITE ORANGE 930815

## (undated) DEVICE — SU ETHICON STRATAFIX SPR PS 3-0 30X30CM SXMP2B412

## (undated) DEVICE — GLOVE BIOGEL PI MICRO INDICATOR UNDERGLOVE SZ 7.0 48970

## (undated) DEVICE — LINEN TOWEL PACK X5 5464

## (undated) DEVICE — BLADE KNIFE SURG 10 371110

## (undated) DEVICE — GLOVE BIOGEL PI MICRO SZ 6.5 48565

## (undated) DEVICE — PACK MAJOR SBA15MAFSI

## (undated) DEVICE — ESU ELEC BLADE NN-STCK PLUMEPEN PRO 360D 10FT PLPRO4020

## (undated) DEVICE — STPL SKIN 35W ROTATING HEAD PRW35

## (undated) DEVICE — GLOVE BIOGEL PI MICRO SZ 7.0 48570

## (undated) DEVICE — CLOSURE SYS SKIN PREMIERPRO EXOFIN FUSION 4X22CM STRL 3472

## (undated) DEVICE — SOL NACL 0.9% IRRIG 1000ML BOTTLE 2F7124

## (undated) DEVICE — DRSG TEGADERM 2 1/2X 2 3/4"

## (undated) DEVICE — SU MONOCRYL 3-0 PS-2 27" Y427H

## (undated) DEVICE — DRAIN JACKSON PRATT RESERVOIR 100ML SU130-1305

## (undated) DEVICE — PAD CHUX UNDERPAD 23X24" 7136

## (undated) DEVICE — SOL WATER IRRIG 1000ML BOTTLE 2F7114

## (undated) DEVICE — SU SILK 2-0 FSL 18" 677G

## (undated) DEVICE — GLOVE BIOGEL PI MICRO INDICATOR UNDERGLOVE SZ 7.5 48975

## (undated) DEVICE — SUCTION CANISTER MEDIVAC LINER 3000ML W/LID 65651-530

## (undated) DEVICE — SU VICRYL 2-0 CT-1 27" UND J259H

## (undated) DEVICE — SU MONOCRYL 4-0 PS-2 18" UND Y496G

## (undated) DEVICE — ESU ELEC BLADE 2.75" COATED/INSULATED E1455

## (undated) DEVICE — DRSG BIOPATCH GERMICIDAL SPLIT SPONGE 7MM LG

## (undated) DEVICE — DRSG ABDOMINAL 07 1/2X8" 7197D

## (undated) DEVICE — PEN MARKING SKIN

## (undated) DEVICE — DRSG KERLIX 4 1/2"X4YDS ROLL 6715

## (undated) DEVICE — DRAPE BREAST/CHEST 29420

## (undated) DEVICE — LINEN GOWN OVERSIZE 5408

## (undated) DEVICE — DRAPE IOBAN INCISE 23X17" 6650EZ

## (undated) RX ORDER — GABAPENTIN 600 MG/1
TABLET ORAL
Status: DISPENSED
Start: 2022-10-31

## (undated) RX ORDER — GLYCOPYRROLATE 0.2 MG/ML
INJECTION, SOLUTION INTRAMUSCULAR; INTRAVENOUS
Status: DISPENSED
Start: 2022-10-31

## (undated) RX ORDER — ACETAMINOPHEN 500 MG
TABLET ORAL
Status: DISPENSED
Start: 2022-10-31

## (undated) RX ORDER — ONDANSETRON 2 MG/ML
INJECTION INTRAMUSCULAR; INTRAVENOUS
Status: DISPENSED
Start: 2022-10-31

## (undated) RX ORDER — FENTANYL CITRATE 50 UG/ML
INJECTION, SOLUTION INTRAMUSCULAR; INTRAVENOUS
Status: DISPENSED
Start: 2021-10-27

## (undated) RX ORDER — FENTANYL CITRATE 0.05 MG/ML
INJECTION, SOLUTION INTRAMUSCULAR; INTRAVENOUS
Status: DISPENSED
Start: 2022-10-31

## (undated) RX ORDER — LIDOCAINE HYDROCHLORIDE 20 MG/ML
INJECTION, SOLUTION EPIDURAL; INFILTRATION; INTRACAUDAL; PERINEURAL
Status: DISPENSED
Start: 2022-10-31

## (undated) RX ORDER — PROPOFOL 10 MG/ML
INJECTION, EMULSION INTRAVENOUS
Status: DISPENSED
Start: 2022-10-31

## (undated) RX ORDER — OXYCODONE HYDROCHLORIDE 5 MG/1
TABLET ORAL
Status: DISPENSED
Start: 2022-10-31

## (undated) RX ORDER — HYDROMORPHONE HCL IN WATER/PF 6 MG/30 ML
PATIENT CONTROLLED ANALGESIA SYRINGE INTRAVENOUS
Status: DISPENSED
Start: 2022-10-31

## (undated) RX ORDER — FENTANYL CITRATE 50 UG/ML
INJECTION, SOLUTION INTRAMUSCULAR; INTRAVENOUS
Status: DISPENSED
Start: 2022-10-31

## (undated) RX ORDER — CEFAZOLIN SODIUM 1 G/3ML
INJECTION, POWDER, FOR SOLUTION INTRAMUSCULAR; INTRAVENOUS
Status: DISPENSED
Start: 2022-10-31

## (undated) RX ORDER — DEXAMETHASONE SODIUM PHOSPHATE 4 MG/ML
INJECTION, SOLUTION INTRA-ARTICULAR; INTRALESIONAL; INTRAMUSCULAR; INTRAVENOUS; SOFT TISSUE
Status: DISPENSED
Start: 2022-10-31

## (undated) RX ORDER — HYDROMORPHONE HYDROCHLORIDE 1 MG/ML
INJECTION, SOLUTION INTRAMUSCULAR; INTRAVENOUS; SUBCUTANEOUS
Status: DISPENSED
Start: 2022-10-31

## (undated) RX ORDER — ACETAMINOPHEN 325 MG/1
TABLET ORAL
Status: DISPENSED
Start: 2022-10-31

## (undated) RX ORDER — ONDANSETRON 4 MG/1
TABLET, ORALLY DISINTEGRATING ORAL
Status: DISPENSED
Start: 2022-10-31

## (undated) RX ORDER — NEOSTIGMINE METHYLSULFATE 1 MG/ML
VIAL (ML) INJECTION
Status: DISPENSED
Start: 2022-10-31

## (undated) RX ORDER — EPHEDRINE SULFATE 50 MG/ML
INJECTION, SOLUTION INTRAMUSCULAR; INTRAVENOUS; SUBCUTANEOUS
Status: DISPENSED
Start: 2022-10-31